# Patient Record
Sex: MALE | Race: WHITE | NOT HISPANIC OR LATINO | Employment: FULL TIME | URBAN - METROPOLITAN AREA
[De-identification: names, ages, dates, MRNs, and addresses within clinical notes are randomized per-mention and may not be internally consistent; named-entity substitution may affect disease eponyms.]

---

## 2017-12-04 ENCOUNTER — OFFICE VISIT (OUTPATIENT)
Dept: INTERNAL MEDICINE | Facility: CLINIC | Age: 47
End: 2017-12-04

## 2017-12-04 VITALS
WEIGHT: 201 LBS | RESPIRATION RATE: 12 BRPM | BODY MASS INDEX: 28.14 KG/M2 | OXYGEN SATURATION: 99 % | DIASTOLIC BLOOD PRESSURE: 80 MMHG | SYSTOLIC BLOOD PRESSURE: 130 MMHG | TEMPERATURE: 97.8 F | HEIGHT: 71 IN | HEART RATE: 87 BPM

## 2017-12-04 DIAGNOSIS — Z00.00 HEALTHCARE MAINTENANCE: Primary | ICD-10-CM

## 2017-12-04 DIAGNOSIS — G89.29 CHRONIC BILATERAL LOW BACK PAIN WITHOUT SCIATICA: ICD-10-CM

## 2017-12-04 DIAGNOSIS — M54.50 CHRONIC BILATERAL LOW BACK PAIN WITHOUT SCIATICA: ICD-10-CM

## 2017-12-04 DIAGNOSIS — M21.70 LEG LENGTH DISCREPANCY: ICD-10-CM

## 2017-12-04 PROBLEM — Z80.0 FAMILY HX OF COLON CANCER: Status: ACTIVE | Noted: 2017-07-24

## 2017-12-04 PROCEDURE — 90715 TDAP VACCINE 7 YRS/> IM: CPT | Performed by: INTERNAL MEDICINE

## 2017-12-04 PROCEDURE — 93000 ELECTROCARDIOGRAM COMPLETE: CPT | Performed by: INTERNAL MEDICINE

## 2017-12-04 PROCEDURE — 90472 IMMUNIZATION ADMIN EACH ADD: CPT | Performed by: INTERNAL MEDICINE

## 2017-12-04 PROCEDURE — 72110 X-RAY EXAM L-2 SPINE 4/>VWS: CPT | Performed by: INTERNAL MEDICINE

## 2017-12-04 PROCEDURE — 90686 IIV4 VACC NO PRSV 0.5 ML IM: CPT | Performed by: INTERNAL MEDICINE

## 2017-12-04 PROCEDURE — 99386 PREV VISIT NEW AGE 40-64: CPT | Performed by: INTERNAL MEDICINE

## 2017-12-04 PROCEDURE — 99212 OFFICE O/P EST SF 10 MIN: CPT | Performed by: INTERNAL MEDICINE

## 2017-12-04 PROCEDURE — 90471 IMMUNIZATION ADMIN: CPT | Performed by: INTERNAL MEDICINE

## 2017-12-04 NOTE — PROGRESS NOTES
"Annual Exam (new pt cpe )      HPI  Omar Ha is a 47 y.o. male presents to establish/ new pt CPE:  Transfer from Dr. Echols, but has not seen in last 5 years.   1. Low back pain - has had this for a long period of time, since high school.  Told in past had a \"curvature of spine\".  Has managed for a long time, but started working out more in Spring and had to stop due to severe pain developing in very low back bilaterally. Pt notes does have leg length discrepancy and feels like that is contributing.  Pain does not radiate down to leg.  No weakness in legs.  Has improved since quit exercising (was doing p90X format at the time).    2. Fhx of colon cancer - has strong Fhx and has had C-scopes since age 26. Q 5 year scope was done last month.  Pt had 2 polyps recently, not sure what pathology was. \"I just know everything was fine\".   3. Nephrolithiasis - occurred 10 years ago. Not sure what type.  Did pass stone on his own.  Not sure about dietary recs, \"there was no restrictions I was put on\".          Review of Systems   Constitution: Positive for weight loss (doiwn 10# overall on the year). Negative for chills, fever, malaise/fatigue and weight gain.   HENT: Negative for congestion, hearing loss, odynophagia and sore throat.    Eyes: Negative for discharge, double vision, pain and redness.        Last eye exam 2016; wears contacts     Cardiovascular: Negative for chest pain, dyspnea on exertion, irregular heartbeat, leg swelling, near-syncope, palpitations and syncope.   Respiratory: Negative for cough and shortness of breath.    Hematologic/Lymphatic: Negative for bleeding problem. Does not bruise/bleed easily.   Skin: Negative for rash, skin cancer and suspicious lesions.        Sees derm annually; no hx of CA in self     Musculoskeletal: Positive for back pain. Negative for joint pain, joint swelling, muscle cramps and muscle weakness.   Gastrointestinal: Negative for constipation, diarrhea, dysphagia, " heartburn, nausea and vomiting.   Genitourinary: Negative for dysuria, frequency, hematuria and hesitancy.   Neurological: Negative for dizziness, headaches and light-headedness.   Psychiatric/Behavioral: Negative for depression. The patient does not have insomnia and is not nervous/anxious.    Allergic/Immunologic: Positive for environmental allergies (has some seasonal issues, never been tested, takes occasional Claritin).       Problem List:    Patient Active Problem List   Diagnosis   • Family hx of colon cancer   • Chronic bilateral low back pain without sciatica       Medical History:    Past Medical History:   Diagnosis Date   • Nephrolithiasis 2007    unknown type of stone        Social History:    Social History     Social History   • Marital status:      Spouse name: N/A   • Number of children: 3   • Years of education: N/A     Occupational History   •       [employer]     Social History Main Topics   • Smoking status: Never Smoker   • Smokeless tobacco: Never Used   • Alcohol use Yes      Comment: 2 drinks/ month   • Drug use: No   • Sexual activity: Yes     Partners: Female      Comment: wife only; no hx STD's     Other Topics Concern   • Not on file     Social History Narrative    Diet - healthy overall, eats fruits and vegetables    Exercise - P90X in past, variable currently    Caffeine - tea, decaf       Family History:   Family History   Problem Relation Age of Onset   • Mitral valve prolapse Mother    • Uterine cancer Mother    • Colon cancer Mother    • Hypertension Mother    • Hypothyroidism Mother    • Melanoma Mother    • Squamous cell carcinoma Mother    • Leukemia Father      CLL   • Skin cancer Father    • Bell's palsy Father    • Hypertension Father    • Hyperlipidemia Father    • Skin cancer Sister    • Colon cancer Brother    • No Known Problems Daughter    • No Known Problems Son    • No Known Problems Daughter        Surgical History:   Past Surgical History:    Procedure Laterality Date   • PATELLA FRACTURE SURGERY  1986       No current outpatient prescriptions on file.    Vitals:    12/04/17 1306   BP: 130/80   Pulse: 87   Resp: 12   Temp: 97.8 °F (36.6 °C)   SpO2: 99%         Physical Exam   Constitutional: He is oriented to person, place, and time. He appears well-developed and well-nourished. He is cooperative. No distress.   HENT:   Head: Normocephalic and atraumatic.   Right Ear: Hearing, tympanic membrane, external ear and ear canal normal.   Left Ear: Hearing, tympanic membrane, external ear and ear canal normal.   Nose: Nose normal.   Mouth/Throat: Uvula is midline, oropharynx is clear and moist and mucous membranes are normal. No oropharyngeal exudate.   Eyes: Conjunctivae, EOM and lids are normal. Pupils are equal, round, and reactive to light. Right eye exhibits no discharge. Left eye exhibits no discharge. No scleral icterus.   Neck: Normal range of motion and full passive range of motion without pain. Neck supple. Carotid bruit is not present. No thyroid mass and no thyromegaly present.   Cardiovascular: Normal rate, regular rhythm, S1 normal, S2 normal, normal heart sounds and intact distal pulses.  Exam reveals no gallop and no friction rub.    No murmur heard.  Pulses:       Carotid pulses are 2+ on the right side, and 2+ on the left side.       Radial pulses are 2+ on the right side, and 2+ on the left side.        Dorsalis pedis pulses are 2+ on the right side, and 2+ on the left side.        Posterior tibial pulses are 2+ on the right side, and 2+ on the left side.   Pulmonary/Chest: Effort normal and breath sounds normal. No respiratory distress. He has no decreased breath sounds. He has no wheezes. He has no rhonchi. He has no rales.   Abdominal: Soft. Bowel sounds are normal. He exhibits no distension and no mass. There is no hepatosplenomegaly. There is no tenderness. There is no rebound and no guarding.   Genitourinary: Rectum normal and  prostate normal. Prostate is not enlarged and not tender.   Musculoskeletal: Normal range of motion. He exhibits no edema.       Vascular Status -  His exam exhibits right foot vasculature abnormal and no right foot edema. His exam exhibits left foot vasculature abnormal and no left foot edema.  Lymphadenopathy:     He has no cervical adenopathy.     He has no axillary adenopathy.        Right: No inguinal adenopathy present.        Left: No inguinal adenopathy present.   Neurological: He is alert and oriented to person, place, and time. He has normal strength and normal reflexes. He displays no tremor. No cranial nerve deficit or sensory deficit. He exhibits normal muscle tone. Gait normal.   Skin: Skin is warm, dry and intact. No rash noted.   Psychiatric: He has a normal mood and affect. His speech is normal and behavior is normal. Cognition and memory are normal.   Vitals reviewed.        ECG 12 Lead  Date/Time: 12/4/2017 2:22 PM  Performed by: DARIUS OBANDO  Authorized by: DARIUS OBANDO   Rhythm: sinus rhythm  Rate: normal  BPM: 72  ST Depression: I, II, III, aVF, V2, V3, V4, V5 and V6 (< 1mm)  T Waves: T waves normal  QRS axis: normal  Clinical impression: non-specific ECG  Comments: QTc - 393  Indication - Baseline, new pt CPE          XR lumbar: Bilateral low back pain; No prior for comparison  Normal alignment  No fracture  No scoliosis  Normal lordosis  No DDD noted    Assessment/ Plan  Diagnoses and all orders for this visit:    Healthcare maintenance  -     CBC & Differential  -     Comprehensive Metabolic Panel  -     Lipid Panel With LDL / HDL Ratio  -     TSH  -     UA / M With / Rflx Culture([facility] ONLY) - Urine, Clean Catch  -     Vitamin D 25 Hydroxy  -     Flu Vaccine Quad PF >18YR  -     Tdap Vaccine Greater Than or Equal To 8yo IM    Chronic bilateral low back pain without sciatica  -     UA / M With / Rflx Culture([facility] ONLY) - Urine, Clean Catch  -     XR Spine Lumbar 4+ View (In  Office)  -     Ambulatory Referral to Physical Therapy Evaluate and treat    Leg length discrepancy  -     Ambulatory Referral to Physical Therapy Evaluate and treat    Other orders  -     ECG 12 Lead        Return in about 1 year (around 12/4/2018) for Annual physical.      Discussion:  Omar Ha is a 47 y.o. male presents to establish/ new pt CPE:  Transfer from Dr. Echols.   1. Low back pain - XR spine is quite normal with normal alignment and no fracture. ? Leg length discrepency as etiology or injury incurred during P90X. I have asked pt to work with PT and see if we can improve sx working on core stregth and stretching. Reassured with normal lumbar spine X-ray today.  RTC is sx not better and can consider additional imaging.   2. Fhx of colon cancer - has strong Fhx with C-scopes since age 26, Q 5 years.  C-scope 11/2017 with 2 polyps --> 5 years.   3. Nephrolithiasis - occurred 10 years ago, passed. No recurrence.   4. Diffuse ST < 1mm depression on EKG - noted in past, per pt. Present on EKG today in multiple leads.  Reviewed normal stress test from 2012. Asx'ic. Monitor.   5. HM - check labs ; Flu/ Tdap - today;  C-scope UTD as above; BARON OK, d/w PSA at age 48; add back exercise.

## 2017-12-08 DIAGNOSIS — Z80.0 FAMILY HISTORY OF LYNCH SYNDROME: Primary | ICD-10-CM

## 2018-01-22 ENCOUNTER — TELEPHONE (OUTPATIENT)
Dept: INTERNAL MEDICINE | Facility: CLINIC | Age: 48
End: 2018-01-22

## 2018-01-22 LAB
25(OH)D3+25(OH)D2 SERPL-MCNC: 21 NG/ML (ref 30–100)
ALBUMIN SERPL-MCNC: 4.6 G/DL (ref 3.5–5.2)
ALBUMIN/GLOB SERPL: 1.7 G/DL
ALP SERPL-CCNC: 52 U/L (ref 39–117)
ALT SERPL-CCNC: 32 U/L (ref 1–41)
APPEARANCE UR: CLEAR
AST SERPL-CCNC: 18 U/L (ref 1–40)
BACTERIA #/AREA URNS HPF: NORMAL /HPF
BASOPHILS # BLD AUTO: 0.03 10*3/MM3 (ref 0–0.2)
BASOPHILS NFR BLD AUTO: 0.6 % (ref 0–1.5)
BILIRUB SERPL-MCNC: 0.7 MG/DL (ref 0.1–1.2)
BILIRUB UR QL STRIP: NEGATIVE
BUN SERPL-MCNC: 17 MG/DL (ref 6–20)
BUN/CREAT SERPL: 16 (ref 7–25)
CALCIUM SERPL-MCNC: 10.3 MG/DL (ref 8.6–10.5)
CHLORIDE SERPL-SCNC: 103 MMOL/L (ref 98–107)
CHOLEST SERPL-MCNC: 203 MG/DL (ref 0–200)
CO2 SERPL-SCNC: 30.7 MMOL/L (ref 22–29)
COLOR UR: YELLOW
CREAT SERPL-MCNC: 1.06 MG/DL (ref 0.76–1.27)
EOSINOPHIL # BLD AUTO: 0.21 10*3/MM3 (ref 0–0.7)
EOSINOPHIL NFR BLD AUTO: 4 % (ref 0.3–6.2)
EPI CELLS #/AREA URNS HPF: NORMAL /HPF
ERYTHROCYTE [DISTWIDTH] IN BLOOD BY AUTOMATED COUNT: 12.6 % (ref 11.5–14.5)
GLOBULIN SER CALC-MCNC: 2.7 GM/DL
GLUCOSE SERPL-MCNC: 96 MG/DL (ref 65–99)
GLUCOSE UR QL: NEGATIVE
HCT VFR BLD AUTO: 46 % (ref 40.4–52.2)
HDLC SERPL-MCNC: 50 MG/DL (ref 40–60)
HGB BLD-MCNC: 16.1 G/DL (ref 13.7–17.6)
HGB UR QL STRIP: NEGATIVE
IMM GRANULOCYTES # BLD: 0 10*3/MM3 (ref 0–0.03)
IMM GRANULOCYTES NFR BLD: 0 % (ref 0–0.5)
KETONES UR QL STRIP: NEGATIVE
LAB DIRECTOR NAME PROVIDER: NORMAL
LDLC SERPL CALC-MCNC: 118 MG/DL (ref 0–100)
LDLC/HDLC SERPL: 2.36 {RATIO}
LEUKOCYTE ESTERASE UR QL STRIP: NEGATIVE
LYMPHOCYTES # BLD AUTO: 1.23 10*3/MM3 (ref 0.9–4.8)
LYMPHOCYTES NFR BLD AUTO: 23.6 % (ref 19.6–45.3)
Lab: NORMAL
MCH RBC QN AUTO: 31 PG (ref 27–32.7)
MCHC RBC AUTO-ENTMCNC: 35 G/DL (ref 32.6–36.4)
MCV RBC AUTO: 88.5 FL (ref 79.8–96.2)
MICRO URNS: NORMAL
MICRO URNS: NORMAL
MLH1+MSH2+MSH6+PMS2 GN DEL+DUP+FUL M: NORMAL
MONOCYTES # BLD AUTO: 0.62 10*3/MM3 (ref 0.2–1.2)
MONOCYTES NFR BLD AUTO: 11.9 % (ref 5–12)
MUCOUS THREADS URNS QL MICRO: PRESENT /HPF
NEUTROPHILS # BLD AUTO: 3.13 10*3/MM3 (ref 1.9–8.1)
NEUTROPHILS NFR BLD AUTO: 59.9 % (ref 42.7–76)
NITRITE UR QL STRIP: NEGATIVE
PH UR STRIP: 5 [PH] (ref 5–7.5)
PLATELET # BLD AUTO: 193 10*3/MM3 (ref 140–500)
POTASSIUM SERPL-SCNC: 4.7 MMOL/L (ref 3.5–5.2)
PROT SERPL-MCNC: 7.3 G/DL (ref 6–8.5)
PROT UR QL STRIP: NEGATIVE
RBC # BLD AUTO: 5.2 10*6/MM3 (ref 4.6–6)
RBC #/AREA URNS HPF: NORMAL /HPF
SODIUM SERPL-SCNC: 146 MMOL/L (ref 136–145)
SP GR UR: 1.02 (ref 1–1.03)
SPECIMEN SOURCE: NORMAL
TRIGL SERPL-MCNC: 176 MG/DL (ref 0–150)
TSH SERPL DL<=0.005 MIU/L-ACNC: 2.38 MIU/ML (ref 0.27–4.2)
URINALYSIS REFLEX: NORMAL
UROBILINOGEN UR STRIP-MCNC: 0.2 MG/DL (ref 0.2–1)
VLDLC SERPL CALC-MCNC: 35.2 MG/DL (ref 5–40)
WBC # BLD AUTO: 5.22 10*3/MM3 (ref 4.5–10.7)
WBC #/AREA URNS HPF: NORMAL /HPF

## 2018-01-22 NOTE — TELEPHONE ENCOUNTER
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 The results are under the lab tab in the pt's chart.

## 2018-02-01 ENCOUNTER — TELEPHONE (OUTPATIENT)
Dept: INTERNAL MEDICINE | Facility: CLINIC | Age: 48
End: 2018-02-01

## 2018-02-01 NOTE — TELEPHONE ENCOUNTER
Call [facility] group. Do they have a genetic counselor at [facility]?? If not, will need to refer to [facility].

## 2018-02-01 NOTE — TELEPHONE ENCOUNTER
Pt called and stated that he is needing a referral for the [facility] for the genetic counselor. 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

## 2018-02-02 NOTE — TELEPHONE ENCOUNTER
Genetic counseling under [facility]. And Diann will take care of the rest. [phone]

## 2018-02-05 DIAGNOSIS — Z14.8 CARRIER OF GENE FOR LYNCH SYNDROME: ICD-10-CM

## 2018-02-05 DIAGNOSIS — Z80.0 FAMILY HISTORY OF LYNCH SYNDROME: Primary | ICD-10-CM

## 2018-04-24 ENCOUNTER — CLINICAL SUPPORT (OUTPATIENT)
Dept: OTHER | Facility: HOSPITAL | Age: 48
End: 2018-04-24

## 2018-04-24 DIAGNOSIS — Z15.09 GENETIC PREDISPOSITION TO CANCER: Primary | ICD-10-CM

## 2018-04-24 DIAGNOSIS — Z80.0 FAMILY HISTORY OF MALIGNANT NEOPLASM OF GASTROINTESTINAL TRACT: ICD-10-CM

## 2018-04-24 PROCEDURE — G0463 HOSPITAL OUTPT CLINIC VISIT: HCPCS

## 2018-11-15 ENCOUNTER — FLU SHOT (OUTPATIENT)
Dept: INTERNAL MEDICINE | Facility: CLINIC | Age: 48
End: 2018-11-15

## 2018-11-15 PROCEDURE — 90471 IMMUNIZATION ADMIN: CPT | Performed by: INTERNAL MEDICINE

## 2018-11-15 PROCEDURE — 90674 CCIIV4 VAC NO PRSV 0.5 ML IM: CPT | Performed by: INTERNAL MEDICINE

## 2018-12-03 DIAGNOSIS — Z00.00 HEALTHCARE MAINTENANCE: Primary | ICD-10-CM

## 2018-12-05 LAB
25(OH)D3+25(OH)D2 SERPL-MCNC: 26 NG/ML (ref 30–100)
ALBUMIN SERPL-MCNC: 5.1 G/DL (ref 3.5–5.2)
ALBUMIN/GLOB SERPL: 2.2 G/DL
ALP SERPL-CCNC: 47 U/L (ref 39–117)
ALT SERPL-CCNC: 30 U/L (ref 1–41)
APPEARANCE UR: CLEAR
AST SERPL-CCNC: 20 U/L (ref 1–40)
BACTERIA #/AREA URNS HPF: NORMAL /HPF
BASOPHILS # BLD AUTO: 0.02 10*3/MM3 (ref 0–0.2)
BASOPHILS NFR BLD AUTO: 0.4 % (ref 0–1.5)
BILIRUB SERPL-MCNC: 0.7 MG/DL (ref 0.1–1.2)
BILIRUB UR QL STRIP: NEGATIVE
BUN SERPL-MCNC: 19 MG/DL (ref 6–20)
BUN/CREAT SERPL: 17.4 (ref 7–25)
CALCIUM SERPL-MCNC: 10 MG/DL (ref 8.6–10.5)
CHLORIDE SERPL-SCNC: 104 MMOL/L (ref 98–107)
CHOLEST SERPL-MCNC: 222 MG/DL (ref 0–200)
CO2 SERPL-SCNC: 29.9 MMOL/L (ref 22–29)
COLOR UR: YELLOW
CREAT SERPL-MCNC: 1.09 MG/DL (ref 0.76–1.27)
EOSINOPHIL # BLD AUTO: 0.19 10*3/MM3 (ref 0–0.7)
EOSINOPHIL NFR BLD AUTO: 3.6 % (ref 0.3–6.2)
EPI CELLS #/AREA URNS HPF: NORMAL /HPF
ERYTHROCYTE [DISTWIDTH] IN BLOOD BY AUTOMATED COUNT: 12.5 % (ref 11.5–14.5)
GLOBULIN SER CALC-MCNC: 2.3 GM/DL
GLUCOSE SERPL-MCNC: 105 MG/DL (ref 65–99)
GLUCOSE UR QL: NEGATIVE
HCT VFR BLD AUTO: 46.8 % (ref 40.4–52.2)
HDLC SERPL-MCNC: 56 MG/DL (ref 40–60)
HGB BLD-MCNC: 16.6 G/DL (ref 13.7–17.6)
HGB UR QL STRIP: NEGATIVE
IMM GRANULOCYTES # BLD: 0.01 10*3/MM3 (ref 0–0.03)
IMM GRANULOCYTES NFR BLD: 0.2 % (ref 0–0.5)
KETONES UR QL STRIP: NEGATIVE
LDLC SERPL CALC-MCNC: 145 MG/DL (ref 0–100)
LEUKOCYTE ESTERASE UR QL STRIP: NEGATIVE
LYMPHOCYTES # BLD AUTO: 1.21 10*3/MM3 (ref 0.9–4.8)
LYMPHOCYTES NFR BLD AUTO: 23 % (ref 19.6–45.3)
MCH RBC QN AUTO: 30.6 PG (ref 27–32.7)
MCHC RBC AUTO-ENTMCNC: 35.5 G/DL (ref 32.6–36.4)
MCV RBC AUTO: 86.3 FL (ref 79.8–96.2)
MICRO URNS: NORMAL
MICRO URNS: NORMAL
MONOCYTES # BLD AUTO: 0.59 10*3/MM3 (ref 0.2–1.2)
MONOCYTES NFR BLD AUTO: 11.2 % (ref 5–12)
NEUTROPHILS # BLD AUTO: 3.25 10*3/MM3 (ref 1.9–8.1)
NEUTROPHILS NFR BLD AUTO: 61.8 % (ref 42.7–76)
NITRITE UR QL STRIP: NEGATIVE
PH UR STRIP: 6 [PH] (ref 5–7.5)
PLATELET # BLD AUTO: 186 10*3/MM3 (ref 140–500)
POTASSIUM SERPL-SCNC: 4.4 MMOL/L (ref 3.5–5.2)
PROT SERPL-MCNC: 7.4 G/DL (ref 6–8.5)
PROT UR QL STRIP: NEGATIVE
RBC # BLD AUTO: 5.42 10*6/MM3 (ref 4.6–6)
RBC #/AREA URNS HPF: NORMAL /HPF
SODIUM SERPL-SCNC: 146 MMOL/L (ref 136–145)
SP GR UR: 1.02 (ref 1–1.03)
TRIGL SERPL-MCNC: 107 MG/DL (ref 0–150)
TSH SERPL DL<=0.005 MIU/L-ACNC: 2.46 MIU/ML (ref 0.27–4.2)
URINALYSIS REFLEX: NORMAL
UROBILINOGEN UR STRIP-MCNC: 0.2 MG/DL (ref 0.2–1)
VLDLC SERPL CALC-MCNC: 21.4 MG/DL (ref 5–40)
WBC # BLD AUTO: 5.26 10*3/MM3 (ref 4.5–10.7)
WBC #/AREA URNS HPF: NORMAL /HPF

## 2018-12-11 ENCOUNTER — OFFICE VISIT (OUTPATIENT)
Dept: INTERNAL MEDICINE | Facility: CLINIC | Age: 48
End: 2018-12-11

## 2018-12-11 VITALS
HEART RATE: 63 BPM | OXYGEN SATURATION: 97 % | SYSTOLIC BLOOD PRESSURE: 130 MMHG | DIASTOLIC BLOOD PRESSURE: 88 MMHG | HEIGHT: 70 IN | TEMPERATURE: 98 F | BODY MASS INDEX: 27.92 KG/M2 | WEIGHT: 195 LBS

## 2018-12-11 DIAGNOSIS — Z80.0 FAMILY HX OF COLON CANCER: ICD-10-CM

## 2018-12-11 DIAGNOSIS — Z00.00 HEALTHCARE MAINTENANCE: Primary | ICD-10-CM

## 2018-12-11 DIAGNOSIS — E78.5 DYSLIPIDEMIA: ICD-10-CM

## 2018-12-11 DIAGNOSIS — Z15.09 LYNCH SYNDROME: ICD-10-CM

## 2018-12-11 DIAGNOSIS — E55.9 VITAMIN D DEFICIENCY: ICD-10-CM

## 2018-12-11 PROBLEM — G89.29 CHRONIC BILATERAL LOW BACK PAIN WITHOUT SCIATICA: Status: RESOLVED | Noted: 2017-12-04 | Resolved: 2018-12-11

## 2018-12-11 PROBLEM — M54.50 CHRONIC BILATERAL LOW BACK PAIN WITHOUT SCIATICA: Status: RESOLVED | Noted: 2017-12-04 | Resolved: 2018-12-11

## 2018-12-11 LAB — PSA SERPL-MCNC: 2.02 NG/ML (ref 0–4)

## 2018-12-11 PROCEDURE — 99396 PREV VISIT EST AGE 40-64: CPT | Performed by: INTERNAL MEDICINE

## 2018-12-11 PROCEDURE — 90471 IMMUNIZATION ADMIN: CPT | Performed by: INTERNAL MEDICINE

## 2018-12-11 PROCEDURE — 90632 HEPA VACCINE ADULT IM: CPT | Performed by: INTERNAL MEDICINE

## 2018-12-11 RX ORDER — MELATONIN
1000 DAILY
COMMUNITY
End: 2020-12-18

## 2018-12-11 NOTE — PROGRESS NOTES
"Annual Exam (CPE )      HPI  Omar Ha is a 48 y.o. male RTC In yearly CPE, review of medical issues:  1. Low back pain -  \"it is really kind of gone\".  Feels like that workout routine had done this to him. Stopped working out and pain went away. Has not restarted yet.    Plans to start routine based on planks that has helped friend with back pain.   2. 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  No blood anywhere. Has no sx.  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  Recs were for C-scope were once yearly.  Has been seeing Dr. Box for last 15 years and has not discussed with him yet.  Plans to change to Dr. Yen where has a Lindquist specialist there.    3. Nephrolithiasis - occurred 10 years ago, passed. No recurrence.     Review of Systems   Constitution: Negative for chills, fever, malaise/fatigue, weight gain and weight loss.   HENT: Negative for congestion, hearing loss, sore throat and tinnitus.    Eyes: Negative for discharge, double vision, pain and redness.        Last eye exam ~12/2017; wears contacts     Cardiovascular: Negative for chest pain, dyspnea on exertion, irregular heartbeat, leg swelling, near-syncope, palpitations and syncope.   Respiratory: Negative for cough and shortness of breath.    Endocrine: Negative for polydipsia, polyphagia and polyuria.   Hematologic/Lymphatic: Negative for bleeding problem. Does not bruise/bleed easily.   Skin: Negative for rash and suspicious lesions.        Sees derm yearly.      Musculoskeletal: Negative for back pain, joint pain, joint swelling, muscle cramps, muscle weakness and myalgias.   Gastrointestinal: Negative for constipation, diarrhea, hematochezia, melena, nausea and vomiting.   Genitourinary: Negative for dysuria, frequency, hematuria and hesitancy.   Neurological: Positive for dizziness (very mild sx a few weeks ago, for one week, resolved. Never " "eval'd. ). Negative for headaches, light-headedness and vertigo.   Psychiatric/Behavioral: Negative for depression. The patient does not have insomnia and is not nervous/anxious.    Allergic/Immunologic: Negative for environmental allergies and persistent infections.       Problem List:    Patient Active Problem List   Diagnosis   • Family hx of colon cancer   • Lindquist syndrome   • Vitamin D deficiency       Medical History:    Past Medical History:   Diagnosis Date   • Chronic bilateral low back pain without sciatica 12/4/2017   • Nephrolithiasis 2007    unknown type of stone   • Vitamin D deficiency 12/11/2018        Social History:    Social History     Socioeconomic History   • Marital status:      Spouse name: Not on file   • Number of children: 3   • Years of education: Not on file   • Highest education level: Not on file   Social Needs   • Financial resource strain: Not on file   • Food insecurity - worry: Not on file   • Food insecurity - inability: Not on file   • Transportation needs - medical: Not on file   • Transportation needs - non-medical: Not on file   Occupational History   • Occupation:      Comment: [employer]   Tobacco Use   • Smoking status: Never Smoker   • Smokeless tobacco: Never Used   Substance and Sexual Activity   • Alcohol use: Yes     Comment: 2 drinks/ month   • Drug use: No   • Sexual activity: Yes     Partners: Female     Comment: wife only; no hx STD's   Other Topics Concern   • Not on file   Social History Narrative    Diet - healthy overall, eats fruits and vegetables; 2018 \"loose\" Paleo diet    Exercise - P90X in past, variable currently    Caffeine - tea, decaf       Family History:   Family History   Problem Relation Age of Onset   • Mitral valve prolapse Mother    • Uterine cancer Mother    • Colon cancer Mother    • Hypertension Mother    • Hypothyroidism Mother    • Melanoma Mother    • Squamous cell carcinoma Mother    • Leukemia Father         CLL   • Skin " cancer Father    • Bell's palsy Father    • Hypertension Father    • Hyperlipidemia Father    • Skin cancer Sister    • Colon cancer Brother    • No Known Problems Daughter    • No Known Problems Son    • No Known Problems Daughter        Surgical History:   Past Surgical History:   Procedure Laterality Date   • PATELLA FRACTURE SURGERY  1986         Current Outpatient Medications:   •  cholecalciferol (VITAMIN D3) 1000 units tablet, Take 1,000 Units by mouth Daily., Disp: , Rfl:     Vitals:    12/11/18 0909   BP: 130/88   Pulse: 63   Temp: 98 °F (36.7 °C)   SpO2: 97%       Physical Exam   Constitutional: He is oriented to person, place, and time. He appears well-developed and well-nourished. He is cooperative. No distress.   HENT:   Head: Normocephalic and atraumatic.   Right Ear: Hearing, tympanic membrane, external ear and ear canal normal.   Left Ear: Hearing, tympanic membrane, external ear and ear canal normal.   Nose: Nose normal.   Mouth/Throat: Uvula is midline, oropharynx is clear and moist and mucous membranes are normal. No oropharyngeal exudate.   Eyes: Conjunctivae, EOM and lids are normal. Pupils are equal, round, and reactive to light. Right eye exhibits no discharge. Left eye exhibits no discharge. No scleral icterus.   Neck: Normal range of motion and full passive range of motion without pain. Neck supple. Carotid bruit is not present. No thyroid mass and no thyromegaly present.   Cardiovascular: Normal rate, regular rhythm, S1 normal, S2 normal and normal heart sounds. Exam reveals no gallop and no friction rub.   No murmur heard.  Pulses:       Radial pulses are 2+ on the right side, and 2+ on the left side.        Dorsalis pedis pulses are 2+ on the right side, and 2+ on the left side.        Posterior tibial pulses are 2+ on the right side, and 2+ on the left side.   Pulmonary/Chest: Effort normal and breath sounds normal. No respiratory distress. He has no wheezes. He has no rhonchi. He has no  rales.   Abdominal: Soft. Bowel sounds are normal. He exhibits no distension and no mass. There is no hepatosplenomegaly. There is no tenderness. There is no rebound and no guarding.   Genitourinary: Rectum normal and prostate normal. Prostate is not enlarged and not tender.   Musculoskeletal: Normal range of motion. He exhibits no edema.     Vascular Status -  His right foot exhibits normal foot vasculature  and no edema. His left foot exhibits normal foot vasculature  and no edema.  Skin Integrity  -  His right foot skin is intact.His left foot skin is intact..  Lymphadenopathy:     He has no cervical adenopathy.     He has no axillary adenopathy.        Right: No inguinal adenopathy present.        Left: No inguinal adenopathy present.   Neurological: He is alert and oriented to person, place, and time. He has normal strength and normal reflexes. He displays no tremor. No cranial nerve deficit or sensory deficit. He exhibits normal muscle tone. Gait normal.   Skin: Skin is warm, dry and intact. No rash noted.        Psychiatric: He has a normal mood and affect. His speech is normal and behavior is normal. Thought content normal. Cognition and memory are normal.   Vitals reviewed.      Assessment/ Plan  Diagnoses and all orders for this visit:    Healthcare maintenance  -     Cancel: Hepatitis A Vaccine Adult IM  -     Hepatitis A Vaccine Adult IM  -     PSA Screen    Family hx of colon cancer    Lindquist syndrome  -     PSA Screen    Vitamin D deficiency    Dyslipidemia    Other orders  -     cholecalciferol (VITAMIN D3) 1000 units tablet; Take 1,000 Units by mouth Daily.        Return in about 1 year (around 12/11/2019) for Annual physical.      Discussion:  Omar Ha is a 48 y.o. male RTC In yearly CPE, review of medical issues:  1. Low back pain - resolved after stopped P90X. Will monitor as pt re-engages in exercise routine.   2. FHx of colon cancer, new dx of Lindquist Syndrome - has strong Fhx with C-scopes  since age 26, Q 5 years. Has now seen Dr. 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  Will proceed with annual U/A, neuro exam with low threshold for MRI brain for any sx, PSA for prostate CA screen.  Anticipate EGD with next C-scope (suggest q 2 years, but will eval with new GI consult at Dr. Yen's office), last C-scope 11/2017 with 2 polyps.  C/W annual skin exam with derm.    3. Hx of Nephrolithiasis - occurred 10 years ago, passed. No recurrence. U/A clear today.   4. Vitamin D deficiency - persists on labs. Start 1000 I.U. Vitamin D3 OTC daily.  5. Dyslipidemia - new issues today with progresion of LDL off exercise regimen. 10 yr CR 3.7% and not statin candidate, but advise pt to c/w TLC diet and add back exercise at least 3x/ week.   6. HM - labs d/w pt; Flu/ Tdap - UTD;  Hep A #1 today; Shingrix at pharmacy; C-scope as above; BARON OK, check PSA yearly; add back exercise.     RTC one year CPE, F labs prior

## 2019-06-13 ENCOUNTER — LAB (OUTPATIENT)
Dept: INTERNAL MEDICINE | Facility: CLINIC | Age: 49
End: 2019-06-13

## 2019-06-13 PROCEDURE — 90471 IMMUNIZATION ADMIN: CPT | Performed by: INTERNAL MEDICINE

## 2019-06-13 PROCEDURE — 90632 HEPA VACCINE ADULT IM: CPT | Performed by: INTERNAL MEDICINE

## 2019-11-04 ENCOUNTER — TELEPHONE (OUTPATIENT)
Dept: INTERNAL MEDICINE | Facility: CLINIC | Age: 49
End: 2019-11-04

## 2019-11-04 NOTE — TELEPHONE ENCOUNTER
Pt is wanting to know if you can send in a muscle relaxer. He woke up this morning with what he thinks is a pinch nerve. He wanted to give it a couple of days to see if it would get better before he made an appointment.

## 2019-11-05 ENCOUNTER — OFFICE VISIT (OUTPATIENT)
Dept: INTERNAL MEDICINE | Facility: CLINIC | Age: 49
End: 2019-11-05

## 2019-11-05 VITALS
OXYGEN SATURATION: 97 % | HEIGHT: 70 IN | WEIGHT: 208 LBS | SYSTOLIC BLOOD PRESSURE: 130 MMHG | BODY MASS INDEX: 29.78 KG/M2 | TEMPERATURE: 98.1 F | HEART RATE: 83 BPM | DIASTOLIC BLOOD PRESSURE: 90 MMHG

## 2019-11-05 DIAGNOSIS — S16.1XXA CERVICAL MUSCLE STRAIN, INITIAL ENCOUNTER: Primary | ICD-10-CM

## 2019-11-05 DIAGNOSIS — M54.2 ACUTE NECK PAIN: ICD-10-CM

## 2019-11-05 PROCEDURE — 72050 X-RAY EXAM NECK SPINE 4/5VWS: CPT | Performed by: INTERNAL MEDICINE

## 2019-11-05 PROCEDURE — 99214 OFFICE O/P EST MOD 30 MIN: CPT | Performed by: INTERNAL MEDICINE

## 2019-11-05 RX ORDER — CYCLOBENZAPRINE HCL 5 MG
5 TABLET ORAL NIGHTLY PRN
Qty: 20 TABLET | Refills: 1 | Status: SHIPPED | OUTPATIENT
Start: 2019-11-05 | End: 2019-12-16

## 2019-11-05 RX ORDER — ACETAMINOPHEN 500 MG
TABLET ORAL
Qty: 30 TABLET | Refills: 0
Start: 2019-11-05 | End: 2019-12-16

## 2019-11-05 NOTE — PROGRESS NOTES
"Neck Pain (stiffness x1 day )      HPI  Omar Ha is a 49 y.o. male RTC in acute care:   WOke up yesterday AM with \"what I assume is a pinched nerve in my neck\".  The night prior was fine.  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  Pain is just to L side, occurs with certain movements.  No radiation. No weakness in arm. NO tingling in arm.  No issues swallowing.  No pain on R side. No pain at rest, but only has pain with certain movements.  No sure exactly what will cause the pain.    No URI sx. No fevers.  No HA.     Recalls vertigo issue in May, resolved.  Similar mild event in 9/2019, but was short lived.  Little dizzy last week, but nothing like this in past.     Meds: Ibuprofen, last dose 4 hours ago.      THinks pain is a bit better today or \"I am getting more wise about my movements\".       Review of Systems   Constitution: Negative for chills and fever.   HENT: Negative for congestion, ear discharge, ear pain, hoarse voice and sore throat.    Cardiovascular: Negative for dyspnea on exertion.   Respiratory: Negative for cough and shortness of breath.    Musculoskeletal: Positive for neck pain. Negative for muscle weakness.   Neurological: Negative for focal weakness, numbness and paresthesias.       The following portions of the patient's history were reviewed and updated as appropriate: allergies, current medications, past medical history, past social history and problem list.      Current Outpatient Medications:   •  cholecalciferol (VITAMIN D3) 1000 units tablet, Take 1,000 Units by mouth Daily., Disp: , Rfl:   •  acetaminophen (TYLENOL) 500 MG tablet, 2 tabs PO Q 8 hours PRN, Disp: 30 tablet, Rfl: 0  •  cyclobenzaprine (FLEXERIL) 5 MG tablet, Take 1 tablet by mouth At Night As Needed for Muscle Spasms., Disp: 20 tablet, Rfl: 1  •  diclofenac (VOLTAREN) 1 % gel gel, Apply 4 g topically to the appropriate area as directed 4 (Four) Times a Day., Disp: 100 g, Rfl: " "1    Vitals:    11/05/19 1453   BP: 130/90   Pulse: 83   Temp: 98.1 °F (36.7 °C)   SpO2: 97%   Weight: 94.3 kg (208 lb)   Height: 177.8 cm (70\")         Physical Exam   Constitutional: He is oriented to person, place, and time. He appears well-developed and well-nourished. He does not have a sickly appearance. He does not appear ill. No distress.   HENT:   Head: Normocephalic and atraumatic.   Right Ear: Tympanic membrane, external ear and ear canal normal.   Left Ear: Tympanic membrane, external ear and ear canal normal.   Nose: No mucosal edema or rhinorrhea. Right sinus exhibits no maxillary sinus tenderness and no frontal sinus tenderness. Left sinus exhibits no maxillary sinus tenderness and no frontal sinus tenderness.   Mouth/Throat: Uvula is midline. Mucous membranes are not pale, not dry and not cyanotic. No oral lesions. No uvula swelling. No oropharyngeal exudate, posterior oropharyngeal edema or posterior oropharyngeal erythema.   Eyes: Conjunctivae are normal. Pupils are equal, round, and reactive to light. Right eye exhibits no discharge. Left eye exhibits no discharge.   Neck: Neck supple. No spinous process tenderness and no muscular tenderness present. No neck rigidity. Decreased range of motion (limited by perceived pain onset, movements slow and cautious. ) present.   Holds neck stiff at rest.    Cardiovascular: Normal rate and regular rhythm.   Pulmonary/Chest: Effort normal and breath sounds normal. No respiratory distress. He has no wheezes. He has no rales.   Lymphadenopathy:     He has no cervical adenopathy.   Neurological: He is alert and oriented to person, place, and time. He has normal strength. No cranial nerve deficit.   Reflex Scores:       Tricep reflexes are 2+ on the right side and 2+ on the left side.       Brachioradialis reflexes are 2+ on the right side and 2+ on the left side.  5/5 BUE strength   Psychiatric: He has a normal mood and affect. His behavior is normal.   Vitals " reviewed.    XR cervical: L sided neck pain; No prior for comparison  Normal alignment  Mild DDD C4-C7  No fracture  Intact facet joints     Assessment/ Plan  Diagnoses and all orders for this visit:    Cervical muscle strain, initial encounter  -     XR Spine Cervical Complete 4 or 5 View  -     diclofenac (VOLTAREN) 1 % gel gel; Apply 4 g topically to the appropriate area as directed 4 (Four) Times a Day.  -     acetaminophen (TYLENOL) 500 MG tablet; 2 tabs PO Q 8 hours PRN  -     cyclobenzaprine (FLEXERIL) 5 MG tablet; Take 1 tablet by mouth At Night As Needed for Muscle Spasms.    Acute neck pain  -     XR Spine Cervical Complete 4 or 5 View  -     diclofenac (VOLTAREN) 1 % gel gel; Apply 4 g topically to the appropriate area as directed 4 (Four) Times a Day.  -     acetaminophen (TYLENOL) 500 MG tablet; 2 tabs PO Q 8 hours PRN  -     cyclobenzaprine (FLEXERIL) 5 MG tablet; Take 1 tablet by mouth At Night As Needed for Muscle Spasms.        Return for Next scheduled follow up.      Discussion:  Omar Ha is a 49 y.o. male RTC in acute care (new issue to examiner) with one day of acute onset positional neck pain after a weekend of looking up for overhead electrical work.  Exam today is neuromuscularly intact, though pt is very cautious with his movements due to fear of intense positional pain event.   I highly suspect muscular strain. Will have pt use tx as noted above along with gentle neck stretch routine.  Reassured pt. Call if fails to improve.

## 2019-12-09 DIAGNOSIS — Z15.09 LYNCH SYNDROME: ICD-10-CM

## 2019-12-09 DIAGNOSIS — Z00.00 HEALTHCARE MAINTENANCE: Primary | ICD-10-CM

## 2019-12-09 DIAGNOSIS — Z12.5 SCREENING FOR PROSTATE CANCER: ICD-10-CM

## 2019-12-09 DIAGNOSIS — E55.9 VITAMIN D DEFICIENCY: ICD-10-CM

## 2019-12-12 LAB
25(OH)D3+25(OH)D2 SERPL-MCNC: 24.4 NG/ML (ref 30–100)
ALBUMIN SERPL-MCNC: 4.7 G/DL (ref 3.5–5.2)
ALBUMIN/GLOB SERPL: 2 G/DL
ALP SERPL-CCNC: 45 U/L (ref 39–117)
ALT SERPL-CCNC: 48 U/L (ref 1–41)
APPEARANCE UR: CLEAR
AST SERPL-CCNC: 27 U/L (ref 1–40)
BACTERIA #/AREA URNS HPF: ABNORMAL /HPF
BASOPHILS # BLD AUTO: 0.02 10*3/MM3 (ref 0–0.2)
BASOPHILS NFR BLD AUTO: 0.4 % (ref 0–1.5)
BILIRUB SERPL-MCNC: 0.7 MG/DL (ref 0.2–1.2)
BILIRUB UR QL STRIP: NEGATIVE
BUN SERPL-MCNC: 13 MG/DL (ref 6–20)
BUN/CREAT SERPL: 12.7 (ref 7–25)
CALCIUM SERPL-MCNC: 9.6 MG/DL (ref 8.6–10.5)
CASTS URNS MICRO: ABNORMAL
CASTS URNS QL MICRO: PRESENT /LPF
CHLORIDE SERPL-SCNC: 104 MMOL/L (ref 98–107)
CHOLEST SERPL-MCNC: 214 MG/DL (ref 0–200)
CO2 SERPL-SCNC: 31.5 MMOL/L (ref 22–29)
COLOR UR: YELLOW
CREAT SERPL-MCNC: 1.02 MG/DL (ref 0.76–1.27)
EOSINOPHIL # BLD AUTO: 0.18 10*3/MM3 (ref 0–0.4)
EOSINOPHIL NFR BLD AUTO: 3.4 % (ref 0.3–6.2)
EPI CELLS #/AREA URNS HPF: ABNORMAL /HPF (ref 0–10)
ERYTHROCYTE [DISTWIDTH] IN BLOOD BY AUTOMATED COUNT: 13.1 % (ref 12.3–15.4)
GLOBULIN SER CALC-MCNC: 2.4 GM/DL
GLUCOSE SERPL-MCNC: 100 MG/DL (ref 65–99)
GLUCOSE UR QL: NEGATIVE
HCT VFR BLD AUTO: 46.4 % (ref 37.5–51)
HDLC SERPL-MCNC: 51 MG/DL (ref 40–60)
HGB BLD-MCNC: 16.2 G/DL (ref 13–17.7)
HGB UR QL STRIP: NEGATIVE
IMM GRANULOCYTES # BLD AUTO: 0.01 10*3/MM3 (ref 0–0.05)
IMM GRANULOCYTES NFR BLD AUTO: 0.2 % (ref 0–0.5)
KETONES UR QL STRIP: NEGATIVE
LDLC SERPL CALC-MCNC: 136 MG/DL (ref 0–100)
LEUKOCYTE ESTERASE UR QL STRIP: NEGATIVE
LYMPHOCYTES # BLD AUTO: 1.35 10*3/MM3 (ref 0.7–3.1)
LYMPHOCYTES NFR BLD AUTO: 25.2 % (ref 19.6–45.3)
MCH RBC QN AUTO: 30.5 PG (ref 26.6–33)
MCHC RBC AUTO-ENTMCNC: 34.9 G/DL (ref 31.5–35.7)
MCV RBC AUTO: 87.2 FL (ref 79–97)
MICRO URNS: NORMAL
MICRO URNS: NORMAL
MONOCYTES # BLD AUTO: 0.52 10*3/MM3 (ref 0.1–0.9)
MONOCYTES NFR BLD AUTO: 9.7 % (ref 5–12)
MUCOUS THREADS URNS QL MICRO: PRESENT /HPF
NEUTROPHILS # BLD AUTO: 3.28 10*3/MM3 (ref 1.7–7)
NEUTROPHILS NFR BLD AUTO: 61.1 % (ref 42.7–76)
NITRITE UR QL STRIP: NEGATIVE
NRBC BLD AUTO-RTO: 0 /100 WBC (ref 0–0.2)
PH UR STRIP: 5.5 [PH] (ref 5–7.5)
PLATELET # BLD AUTO: 226 10*3/MM3 (ref 140–450)
POTASSIUM SERPL-SCNC: 4.7 MMOL/L (ref 3.5–5.2)
PROT SERPL-MCNC: 7.1 G/DL (ref 6–8.5)
PROT UR QL STRIP: NEGATIVE
PSA SERPL-MCNC: 2.14 NG/ML (ref 0–4)
RBC # BLD AUTO: 5.32 10*6/MM3 (ref 4.14–5.8)
RBC #/AREA URNS HPF: ABNORMAL /HPF (ref 0–2)
SODIUM SERPL-SCNC: 145 MMOL/L (ref 136–145)
SP GR UR: 1.01 (ref 1–1.03)
TRIGL SERPL-MCNC: 134 MG/DL (ref 0–150)
TSH SERPL DL<=0.005 MIU/L-ACNC: 2.3 UIU/ML (ref 0.27–4.2)
URINALYSIS REFLEX: NORMAL
UROBILINOGEN UR STRIP-MCNC: 0.2 MG/DL (ref 0.2–1)
VLDLC SERPL CALC-MCNC: 26.8 MG/DL
WBC # BLD AUTO: 5.36 10*3/MM3 (ref 3.4–10.8)
WBC #/AREA URNS HPF: ABNORMAL /HPF (ref 0–5)

## 2019-12-16 ENCOUNTER — OFFICE VISIT (OUTPATIENT)
Dept: INTERNAL MEDICINE | Facility: CLINIC | Age: 49
End: 2019-12-16

## 2019-12-16 VITALS
RESPIRATION RATE: 12 BRPM | SYSTOLIC BLOOD PRESSURE: 132 MMHG | WEIGHT: 206 LBS | HEART RATE: 79 BPM | TEMPERATURE: 98.8 F | HEIGHT: 70 IN | DIASTOLIC BLOOD PRESSURE: 82 MMHG | BODY MASS INDEX: 29.49 KG/M2 | OXYGEN SATURATION: 98 %

## 2019-12-16 DIAGNOSIS — Z15.09 LYNCH SYNDROME: ICD-10-CM

## 2019-12-16 DIAGNOSIS — E55.9 VITAMIN D DEFICIENCY: ICD-10-CM

## 2019-12-16 DIAGNOSIS — Z00.00 HEALTHCARE MAINTENANCE: Primary | ICD-10-CM

## 2019-12-16 DIAGNOSIS — R74.01 ELEVATED ALT MEASUREMENT: ICD-10-CM

## 2019-12-16 DIAGNOSIS — Z80.0 FAMILY HX OF COLON CANCER: ICD-10-CM

## 2019-12-16 DIAGNOSIS — Z91.09 ENVIRONMENTAL ALLERGIES: ICD-10-CM

## 2019-12-16 PROBLEM — K64.4 EXTERNAL HEMORRHOIDS: Status: ACTIVE | Noted: 2019-12-16

## 2019-12-16 PROCEDURE — 90674 CCIIV4 VAC NO PRSV 0.5 ML IM: CPT | Performed by: INTERNAL MEDICINE

## 2019-12-16 PROCEDURE — 99396 PREV VISIT EST AGE 40-64: CPT | Performed by: INTERNAL MEDICINE

## 2019-12-16 PROCEDURE — 90471 IMMUNIZATION ADMIN: CPT | Performed by: INTERNAL MEDICINE

## 2019-12-16 NOTE — PROGRESS NOTES
"Annual Exam (review of medical issues )      HPI   Omar Ha is a 49 y.o. male RTC in yearly CPE, review of medical issues:   Had some cervical pain after some overhead electrical work.  Resolved at 3 weeks. No stretches now. Has some feeling at spot but no pain now.  No pain radiating down arms.   1. Low back pain - resolved after stopped P90X. Will monitor as pt re-engages in exercise routine.   2. 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 Has now seen Dr. 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  Will get C-scope with EGD next month.  Will change to Dr. Yen as they 'specialize in Lindquist syndrome'.  Saw derm this past month.   3. Vitamin D deficiency - 1000 I.U. Vitamin D3 OTC most days, is not daily.       Review of Systems   Constitution: Positive for weight gain (up 10-12# since last year. ). Negative for chills, fever and malaise/fatigue.   HENT: Negative for congestion, hearing loss, odynophagia and sore throat.    Eyes: Negative for discharge, double vision, pain and redness.        Last eye exam 11/2019; wears contacts     Cardiovascular: Negative for chest pain, dyspnea on exertion, irregular heartbeat, leg swelling, near-syncope, palpitations and syncope.   Respiratory: Negative for cough and shortness of breath.    Endocrine: Negative for polydipsia, polyphagia and polyuria.   Hematologic/Lymphatic: Negative for bleeding problem. Does not bruise/bleed easily.   Skin: Negative for rash and suspicious lesions.   Musculoskeletal: Negative for joint pain, joint swelling, muscle cramps, muscle weakness, myalgias and neck pain (very mild awareness in neck).   Gastrointestinal: Positive for melena (a few weeks ago with hemorrhoid, \"a lot of blood\". Resolved at this time. ). Negative for constipation, diarrhea, dysphagia, heartburn (intermittent; ETOH is trigger; Tums up to 2x/ month. ), nausea and vomiting.   Genitourinary: Negative for " "dysuria, frequency, hematuria and hesitancy.   Neurological: Negative for dizziness, headaches and light-headedness.   Psychiatric/Behavioral: Negative for depression. The patient does not have insomnia and is not nervous/anxious.    Allergic/Immunologic: Positive for environmental allergies (nagging issues long term. uses Claritin once montlhly). Negative for persistent infections.       Problem List:    Patient Active Problem List   Diagnosis   • Family hx of colon cancer   • Lindquist syndrome   • Vitamin D deficiency   • Environmental allergies   • External hemorrhoids       Medical History:    Past Medical History:   Diagnosis Date   • Chronic bilateral low back pain without sciatica 12/4/2017   • Environmental allergies 12/16/2019   • External hemorrhoids 12/16/2019    Hx bleeding in 2019   • Nephrolithiasis 2007    unknown type of stone   • Vitamin D deficiency 12/11/2018        Social History:    Social History     Socioeconomic History   • Marital status:      Spouse name: Not on file   • Number of children: 3   • Years of education: Not on file   • Highest education level: Not on file   Occupational History   • Occupation:      Comment: [employer]   Tobacco Use   • Smoking status: Never Smoker   • Smokeless tobacco: Never Used   Substance and Sexual Activity   • Alcohol use: Yes     Comment: 2 drinks/ month   • Drug use: No   • Sexual activity: Yes     Partners: Female     Comment: wife only; no hx STD's   Lifestyle   • Physical activity:     Days per week: 3 days     Minutes per session: 50 min   • Stress: Not on file   Social History Narrative    Diet - healthy overall, eats fruits and vegetables; 2018 \"loose\" Paleo diet 3-4 months    Exercise - P90X in past; variable     Caffeine - tea, decaf       Family History:   Family History   Problem Relation Age of Onset   • Mitral valve prolapse Mother    • Uterine cancer Mother    • Colon cancer Mother    • Hypertension Mother    • Hypothyroidism " Mother    • Melanoma Mother    • Squamous cell carcinoma Mother    • Leukemia Father         CLL   • Skin cancer Father    • Bell's palsy Father    • Hypertension Father    • Hyperlipidemia Father    • Heart failure Father    • Heart disease Father         CABG x 4   • Skin cancer Sister    • Colon cancer Brother    • No Known Problems Daughter    • No Known Problems Son    • No Known Problems Daughter        Surgical History:   Past Surgical History:   Procedure Laterality Date   • PATELLA FRACTURE SURGERY Right 1986         Current Outpatient Medications:   •  cholecalciferol (VITAMIN D3) 1000 units tablet, Take 1,000 Units by mouth Daily., Disp: , Rfl:     Vitals:    12/16/19 1458   BP: 132/82   Pulse: 79   Resp: 12   Temp: 98.8 °F (37.1 °C)   SpO2: 98%     Body mass index is 29.56 kg/m².    Physical Exam   Constitutional: He is oriented to person, place, and time. He appears well-developed and well-nourished. He is cooperative. No distress.   HENT:   Head: Normocephalic and atraumatic.   Right Ear: Hearing, tympanic membrane, external ear and ear canal normal.   Left Ear: Hearing, tympanic membrane, external ear and ear canal normal.   Nose: Nose normal.   Mouth/Throat: Uvula is midline, oropharynx is clear and moist and mucous membranes are normal. No oropharyngeal exudate.   Eyes: Pupils are equal, round, and reactive to light. Conjunctivae, EOM and lids are normal. Right eye exhibits no discharge. Left eye exhibits no discharge. No scleral icterus.   Neck: Normal range of motion and full passive range of motion without pain. Neck supple. Carotid bruit is not present. No thyroid mass and no thyromegaly present.   Cardiovascular: Normal rate, regular rhythm, S1 normal, S2 normal and normal heart sounds. Exam reveals no gallop and no friction rub.   No murmur heard.  Pulses:       Radial pulses are 2+ on the right side, and 2+ on the left side.        Dorsalis pedis pulses are 2+ on the right side, and 2+ on the  left side.        Posterior tibial pulses are 2+ on the right side, and 2+ on the left side.   Pulmonary/Chest: Effort normal and breath sounds normal. No respiratory distress. He has no wheezes. He has no rhonchi. He has no rales.   Abdominal: Soft. Bowel sounds are normal. He exhibits no distension and no mass. There is no hepatosplenomegaly. There is no tenderness. There is no rebound and no guarding.   Genitourinary: Prostate normal. Rectal exam shows external hemorrhoid (non-thrombosed). Rectal exam shows anal tone normal. Prostate is not enlarged and not tender.   Musculoskeletal: He exhibits no edema.        Right knee: He exhibits normal range of motion, no swelling, no effusion and normal meniscus. No tenderness found.        Left knee: He exhibits normal range of motion, no swelling, no effusion, normal patellar mobility and normal meniscus. No tenderness found.        Cervical back: He exhibits decreased range of motion (minimal in all directions.). He exhibits no tenderness, no bony tenderness, no edema, no deformity and no pain.     Vascular Status -  His right foot exhibits normal foot vasculature  and no edema. His left foot exhibits normal foot vasculature  and no edema.  Skin Integrity  -  His right foot skin is intact.His left foot skin is intact..  Lymphadenopathy:     He has no cervical adenopathy.     He has no axillary adenopathy.        Right: No inguinal adenopathy present.        Left: No inguinal adenopathy present.   Neurological: He is alert and oriented to person, place, and time. He has normal strength and normal reflexes. He displays no tremor. No cranial nerve deficit or sensory deficit. He exhibits normal muscle tone. Gait normal.   Skin: Skin is warm, dry and intact. No rash noted.   Psychiatric: He has a normal mood and affect. His speech is normal and behavior is normal. Thought content normal. Cognition and memory are normal.   Vitals reviewed.      Assessment/ Plan  Diagnoses  and all orders for this visit:    Healthcare maintenance  -     Flucelvax Quad=>4Years ([id number])    Vitamin D deficiency    Family hx of colon cancer    Lindquist syndrome    Environmental allergies    Elevated ALT measurement        Return in about 1 year (around 12/16/2020) for Annual physical.      Discussion:  Oamr Ha is a 49 y.o. male RTC in yearly CPE, review of medical issues:    1. Low back pain resolved after stopped P90X; cervical neck pain in 11/2019 - resolved with rest and stretches. Advised regular exercise and stretches daily.   2. FHx of colon cancer, new dx of Lindquist Syndrome, dx by Dr. Villasenor in genetics and has dx of Lindquist Syndrome - Will get C-scope with EGD next month with Dr. Yen.  Will proceed with annual U/A, neuro exam with low threshold for MRI brain for any sx. PSA and exam (-) for prostate CA screen today.  C/W annual skin exam with derm.    3. Hx of Nephrolithiasis - occurred 10 years ago, passed. No recurrence. U/A clear today.  4. Vitamin D deficiency - persists.  Take 1000 I.U. Vitamin D3 OTC daily, no missed doses.  5. External hemorrhoids - recent flare with bleeding.  Advised daily fiber.  Sitz baths and PrepH if any flares.  Re-exam if any pain or persistent blood.   6. Environmental Alleriges - long hx, mild sx.  Claritin PRN OK.  7. ALT elevation - suspect is due to fatty liver with weight gain. Weight loss advised. Will trend numbers.   8. HM - labs d/w pt; Flu - today; Tdap/ Hep A - UTD;  Shingrix at pharmacy; C-scope as above; BARON/ PSA yearly, OK today; add back exercise for weight loss.

## 2020-07-30 ENCOUNTER — AMBULATORY SURGICAL CENTER (OUTPATIENT)
Dept: URBAN - METROPOLITAN AREA SURGERY 20 | Facility: SURGERY | Age: 50
End: 2020-07-30
Payer: COMMERCIAL

## 2020-07-30 ENCOUNTER — OFFICE (OUTPATIENT)
Dept: URBAN - METROPOLITAN AREA PATHOLOGY 4 | Facility: PATHOLOGY | Age: 50
End: 2020-07-30

## 2020-07-30 DIAGNOSIS — K31.89 OTHER DISEASES OF STOMACH AND DUODENUM: ICD-10-CM

## 2020-07-30 DIAGNOSIS — Z15.09 GENETIC SUSCEPTIBILITY TO OTHER MALIGNANT NEOPLASM: ICD-10-CM

## 2020-07-30 DIAGNOSIS — Z80.0 FAMILY HISTORY OF MALIGNANT NEOPLASM OF DIGESTIVE ORGANS: ICD-10-CM

## 2020-07-30 DIAGNOSIS — Z12.11 ENCOUNTER FOR SCREENING FOR MALIGNANT NEOPLASM OF COLON: ICD-10-CM

## 2020-07-30 DIAGNOSIS — K44.9 DIAPHRAGMATIC HERNIA WITHOUT OBSTRUCTION OR GANGRENE: ICD-10-CM

## 2020-07-30 DIAGNOSIS — D37.1 NEOPLASM OF UNCERTAIN BEHAVIOR OF STOMACH: ICD-10-CM

## 2020-07-30 PROBLEM — Z80.9 FAMILY HISTORY OF COLON CANCER: Status: ACTIVE | Noted: 2020-07-30

## 2020-07-30 LAB
GI HISTOLOGY: A. SELECT: (no result)
GI HISTOLOGY: PDF REPORT: (no result)

## 2020-07-30 PROCEDURE — 45378 DIAGNOSTIC COLONOSCOPY: CPT | Performed by: INTERNAL MEDICINE

## 2020-07-30 PROCEDURE — 43239 EGD BIOPSY SINGLE/MULTIPLE: CPT | Performed by: INTERNAL MEDICINE

## 2020-07-30 PROCEDURE — 88305 TISSUE EXAM BY PATHOLOGIST: CPT | Performed by: INTERNAL MEDICINE

## 2020-12-14 DIAGNOSIS — Z11.59 NEED FOR HEPATITIS C SCREENING TEST: ICD-10-CM

## 2020-12-14 DIAGNOSIS — E55.9 VITAMIN D DEFICIENCY: ICD-10-CM

## 2020-12-14 DIAGNOSIS — Z00.00 HEALTHCARE MAINTENANCE: Primary | ICD-10-CM

## 2020-12-16 LAB
25(OH)D3+25(OH)D2 SERPL-MCNC: 25.7 NG/ML (ref 30–100)
ALBUMIN SERPL-MCNC: 4.9 G/DL (ref 3.5–5.2)
ALBUMIN/GLOB SERPL: 2 G/DL
ALP SERPL-CCNC: 51 U/L (ref 39–117)
ALT SERPL-CCNC: 61 U/L (ref 1–41)
APPEARANCE UR: CLEAR
AST SERPL-CCNC: 30 U/L (ref 1–40)
BACTERIA #/AREA URNS HPF: NORMAL /HPF
BASOPHILS # BLD AUTO: 0.03 10*3/MM3 (ref 0–0.2)
BASOPHILS NFR BLD AUTO: 0.6 % (ref 0–1.5)
BILIRUB SERPL-MCNC: 0.8 MG/DL (ref 0–1.2)
BILIRUB UR QL STRIP: NEGATIVE
BUN SERPL-MCNC: 15 MG/DL (ref 6–20)
BUN/CREAT SERPL: 14.7 (ref 7–25)
CALCIUM SERPL-MCNC: 10.1 MG/DL (ref 8.6–10.5)
CHLORIDE SERPL-SCNC: 103 MMOL/L (ref 98–107)
CHOLEST SERPL-MCNC: 225 MG/DL (ref 0–200)
CO2 SERPL-SCNC: 29.3 MMOL/L (ref 22–29)
COLOR UR: YELLOW
CREAT SERPL-MCNC: 1.02 MG/DL (ref 0.76–1.27)
EOSINOPHIL # BLD AUTO: 0.13 10*3/MM3 (ref 0–0.4)
EOSINOPHIL NFR BLD AUTO: 2.5 % (ref 0.3–6.2)
EPI CELLS #/AREA URNS HPF: NORMAL /HPF (ref 0–10)
ERYTHROCYTE [DISTWIDTH] IN BLOOD BY AUTOMATED COUNT: 13.1 % (ref 12.3–15.4)
GLOBULIN SER CALC-MCNC: 2.4 GM/DL
GLUCOSE SERPL-MCNC: 104 MG/DL (ref 65–99)
GLUCOSE UR QL: NEGATIVE
HCT VFR BLD AUTO: 50.3 % (ref 37.5–51)
HCV AB S/CO SERPL IA: <0.1 S/CO RATIO (ref 0–0.9)
HDLC SERPL-MCNC: 51 MG/DL (ref 40–60)
HGB BLD-MCNC: 17 G/DL (ref 13–17.7)
HGB UR QL STRIP: NEGATIVE
IMM GRANULOCYTES # BLD AUTO: 0.01 10*3/MM3 (ref 0–0.05)
IMM GRANULOCYTES NFR BLD AUTO: 0.2 % (ref 0–0.5)
KETONES UR QL STRIP: NEGATIVE
LDLC SERPL CALC-MCNC: 138 MG/DL (ref 0–100)
LEUKOCYTE ESTERASE UR QL STRIP: NEGATIVE
LYMPHOCYTES # BLD AUTO: 1.31 10*3/MM3 (ref 0.7–3.1)
LYMPHOCYTES NFR BLD AUTO: 25.2 % (ref 19.6–45.3)
MCH RBC QN AUTO: 29.6 PG (ref 26.6–33)
MCHC RBC AUTO-ENTMCNC: 33.8 G/DL (ref 31.5–35.7)
MCV RBC AUTO: 87.6 FL (ref 79–97)
MICRO URNS: NORMAL
MICRO URNS: NORMAL
MONOCYTES # BLD AUTO: 0.51 10*3/MM3 (ref 0.1–0.9)
MONOCYTES NFR BLD AUTO: 9.8 % (ref 5–12)
MUCOUS THREADS URNS QL MICRO: PRESENT /HPF
NEUTROPHILS # BLD AUTO: 3.21 10*3/MM3 (ref 1.7–7)
NEUTROPHILS NFR BLD AUTO: 61.7 % (ref 42.7–76)
NITRITE UR QL STRIP: NEGATIVE
NRBC BLD AUTO-RTO: 0 /100 WBC (ref 0–0.2)
PH UR STRIP: 6 [PH] (ref 5–7.5)
PLATELET # BLD AUTO: 225 10*3/MM3 (ref 140–450)
POTASSIUM SERPL-SCNC: 4.5 MMOL/L (ref 3.5–5.2)
PROT SERPL-MCNC: 7.3 G/DL (ref 6–8.5)
PROT UR QL STRIP: NEGATIVE
RBC # BLD AUTO: 5.74 10*6/MM3 (ref 4.14–5.8)
RBC #/AREA URNS HPF: NORMAL /HPF (ref 0–2)
SODIUM SERPL-SCNC: 143 MMOL/L (ref 136–145)
SP GR UR: 1.02 (ref 1–1.03)
TRIGL SERPL-MCNC: 204 MG/DL (ref 0–150)
TSH SERPL DL<=0.005 MIU/L-ACNC: 2.79 UIU/ML (ref 0.27–4.2)
URINALYSIS REFLEX: NORMAL
UROBILINOGEN UR STRIP-MCNC: 0.2 MG/DL (ref 0.2–1)
VLDLC SERPL CALC-MCNC: 36 MG/DL (ref 5–40)
WBC # BLD AUTO: 5.2 10*3/MM3 (ref 3.4–10.8)
WBC #/AREA URNS HPF: NORMAL /HPF (ref 0–5)

## 2020-12-18 ENCOUNTER — OFFICE VISIT (OUTPATIENT)
Dept: INTERNAL MEDICINE | Facility: CLINIC | Age: 50
End: 2020-12-18

## 2020-12-18 VITALS
RESPIRATION RATE: 12 BRPM | HEART RATE: 84 BPM | HEIGHT: 70 IN | BODY MASS INDEX: 29.35 KG/M2 | DIASTOLIC BLOOD PRESSURE: 80 MMHG | OXYGEN SATURATION: 95 % | WEIGHT: 205 LBS | TEMPERATURE: 97.1 F | SYSTOLIC BLOOD PRESSURE: 130 MMHG

## 2020-12-18 DIAGNOSIS — M79.675 PAIN AND SWELLING OF TOE OF LEFT FOOT: ICD-10-CM

## 2020-12-18 DIAGNOSIS — R73.01 IFG (IMPAIRED FASTING GLUCOSE): ICD-10-CM

## 2020-12-18 DIAGNOSIS — M79.89 PAIN AND SWELLING OF TOE OF LEFT FOOT: ICD-10-CM

## 2020-12-18 DIAGNOSIS — Z80.0 FAMILY HX OF COLON CANCER: ICD-10-CM

## 2020-12-18 DIAGNOSIS — E55.9 VITAMIN D DEFICIENCY: ICD-10-CM

## 2020-12-18 DIAGNOSIS — Z00.00 HEALTHCARE MAINTENANCE: Primary | ICD-10-CM

## 2020-12-18 DIAGNOSIS — R74.01 ELEVATED ALT MEASUREMENT: ICD-10-CM

## 2020-12-18 DIAGNOSIS — F41.8 ANXIETY ABOUT HEALTH: ICD-10-CM

## 2020-12-18 DIAGNOSIS — M50.30 DDD (DEGENERATIVE DISC DISEASE), CERVICAL: ICD-10-CM

## 2020-12-18 DIAGNOSIS — M10.9 PODAGRA: ICD-10-CM

## 2020-12-18 DIAGNOSIS — Z15.09 LYNCH SYNDROME: ICD-10-CM

## 2020-12-18 DIAGNOSIS — Z91.09 ENVIRONMENTAL ALLERGIES: ICD-10-CM

## 2020-12-18 DIAGNOSIS — K64.4 EXTERNAL HEMORRHOIDS: ICD-10-CM

## 2020-12-18 LAB
HBA1C MFR BLD: 5.3 % (ref 4.8–5.6)
Lab: NORMAL
URATE SERPL-MCNC: 6.8 MG/DL (ref 3.4–7)
WRITTEN AUTHORIZATION: NORMAL

## 2020-12-18 PROCEDURE — 99213 OFFICE O/P EST LOW 20 MIN: CPT | Performed by: INTERNAL MEDICINE

## 2020-12-18 PROCEDURE — 99396 PREV VISIT EST AGE 40-64: CPT | Performed by: INTERNAL MEDICINE

## 2020-12-18 PROCEDURE — 73630 X-RAY EXAM OF FOOT: CPT | Performed by: INTERNAL MEDICINE

## 2020-12-18 RX ORDER — INDOMETHACIN 50 MG/1
50 CAPSULE ORAL
Qty: 21 CAPSULE | Refills: 0 | Status: SHIPPED | OUTPATIENT
Start: 2020-12-18 | End: 2020-12-25

## 2020-12-18 RX ORDER — COLCHICINE 0.6 MG/1
TABLET ORAL
Qty: 3 TABLET | Refills: 0 | Status: SHIPPED | OUTPATIENT
Start: 2020-12-18 | End: 2022-01-12

## 2020-12-18 RX ORDER — MELATONIN
1000 DAILY
Start: 2020-12-18

## 2020-12-18 NOTE — PROGRESS NOTES
"Annual Exam (review of medical issues )      HPI  Omar Ha is a 50 y.o. male RTC In yearly CPE, review of medical issues:   Notes has been doing well.  Went to bed Monday night this past week and was fine. Woke in AM with swollen L foot and was throbbing and swollen.  No weight bearing due to pain.  Not improved, so used boot from daughters injury and has been a bit better.  No fevers.  No rash. Midl erythema and puffiness.  \"it is my big toe and up the bridge of the foot'. Similar event never happened in past.   Recalls had issues with neck pain last year in middle of night.  That resolved.  Had some issues with low back pain and 'pop' in 2/2020 and took 4 days to get better. Wonders 'if falling apart'.     1. Low back pain resolved after stopped P90X; cervical neck pain in 11/2019 - resolved with rest and stretches. Pt is not exercising now and is not stretching. Is fearful of what he did and any reccurence.  2. 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 Told OK and needed in 2 years on f/u.   3. Hx of Nephrolithiasis - occurred 10 years ago, passed. No recurrence.  4. Vitamin D deficiency - on some Vit D daily, but not the recommended 1000 I.U. Vitamin D3 OTC daily.   5. External hemorrhoids - really no issue since last year. Did short term fiber and stopped, 'it just fell off the radar'.  Really no bleeding.   6. Environmental Alleriges - seasonal, mild. NO issues at this time.     Review of Systems   Constitution: Positive for weight gain (less active, working from home. ). Negative for chills, fever, malaise/fatigue and weight loss.   HENT: Negative for congestion, hearing loss, odynophagia and sore throat.    Eyes: Negative for discharge, double vision, pain and redness.        Last eye exam 12/2020; wears glasses     Cardiovascular: Negative for chest pain, dyspnea on exertion, irregular heartbeat, leg " swelling, near-syncope, palpitations and syncope.   Respiratory: Positive for snoring (wife notes snoring issue, positional). Negative for cough, shortness of breath and sleep disturbances due to breathing.    Endocrine: Negative for polydipsia, polyphagia and polyuria.   Hematologic/Lymphatic: Negative for bleeding problem. Does not bruise/bleed easily.   Skin: Negative for rash and suspicious lesions.   Musculoskeletal: Positive for joint pain (L foot at big toe). Negative for arthritis, back pain, joint swelling, muscle cramps, muscle weakness, myalgias and neck pain.   Gastrointestinal: Negative for constipation, diarrhea, dysphagia, heartburn, nausea and vomiting.   Genitourinary: Positive for nocturia (0-1x/ night). Negative for dysuria, frequency, hematuria, hesitancy and incomplete emptying.   Neurological: Negative for excessive daytime sleepiness, dizziness, headaches, light-headedness and vertigo (resolved, no recurrence since 2/2020. Resolved with [facility]. ).   Psychiatric/Behavioral: Negative for depression. The patient does not have insomnia (variable at times with stress at work.\) and is not nervous/anxious.    Allergic/Immunologic: Negative for environmental allergies (seasonal, no issues right now) and persistent infections.       Problem List:    Patient Active Problem List   Diagnosis   • Family hx of colon cancer   • Lindquist syndrome   • Vitamin D deficiency   • Environmental allergies   • External hemorrhoids   • IFG (impaired fasting glucose)   • DDD (degenerative disc disease), cervical       Medical History:    Past Medical History:   Diagnosis Date   • Chronic bilateral low back pain without sciatica 12/4/2017   • Environmental allergies 12/16/2019   • External hemorrhoids 12/16/2019    Hx bleeding in 2019   • Nephrolithiasis 2007    unknown type of stone   • Vitamin D deficiency 12/11/2018        Social History:    Social History     Socioeconomic History   • Marital status:      Spouse  "name: Not on file   • Number of children: 3   • Years of education: Not on file   • Highest education level: Not on file   Occupational History   • Occupation:      Comment: [employer]   Tobacco Use   • Smoking status: Never Smoker   • Smokeless tobacco: Never Used   Substance and Sexual Activity   • Alcohol use: Yes     Comment: 2 drinks/ month   • Drug use: No   • Sexual activity: Yes     Partners: Female     Comment: wife only; no hx STD's   Lifestyle   • Physical activity     Days per week: 0 days     Minutes per session: 0 min   • Stress: Not on file   Social History Narrative    Diet - healthy overall, eats fruits and vegetables; 2018 \"loose\" Paleo diet 3-4 months    Exercise - P90X in past; none in 2020, has new treadmill    Caffeine - tea, decaf       Family History:   Family History   Problem Relation Age of Onset   • Mitral valve prolapse Mother    • Uterine cancer Mother    • Colon cancer Mother         Lindquist Syndrome   • Hypertension Mother    • Hypothyroidism Mother    • Melanoma Mother    • Squamous cell carcinoma Mother    • Leukemia Father         CLL   • Skin cancer Father    • Bell's palsy Father    • Hypertension Father    • Hyperlipidemia Father    • Heart failure Father    • Heart disease Father         CABG x 4   • Skin cancer Sister    • Colon cancer Brother         Lindquist Syndrome   • No Known Problems Daughter    • No Known Problems Son    • No Known Problems Daughter        Surgical History:   Past Surgical History:   Procedure Laterality Date   • PATELLA FRACTURE SURGERY Right 1986         Current Outpatient Medications:   •  cholecalciferol (VITAMIN D3) 25 MCG (1000 UT) tablet, Take 1 tablet by mouth Daily., Disp:  , Rfl:   •  colchicine 0.6 MG tablet, Take 2 tabs PO now and then 1 tab PO one hour later, Disp: 3 tablet, Rfl: 0  •  indomethacin (INDOCIN) 50 MG capsule, Take 1 capsule by mouth 3 (Three) Times a Day With Meals for 7 days., Disp: 21 capsule, Rfl: 0    Vitals:    " 12/18/20 1311   BP: 130/80   Pulse: 84   Resp: 12   Temp: 97.1 °F (36.2 °C)   SpO2: 95%     Body mass index is 29.41 kg/m².    Physical Exam  Vitals signs reviewed.   Constitutional:       General: He is not in acute distress.     Appearance: Normal appearance. He is well-developed. He is not ill-appearing or toxic-appearing.   HENT:      Head: Normocephalic and atraumatic.      Right Ear: Hearing, tympanic membrane, ear canal and external ear normal.      Left Ear: Hearing, tympanic membrane, ear canal and external ear normal.      Nose: Nose normal.      Mouth/Throat:      Mouth: Mucous membranes are moist. No oral lesions.      Tongue: No lesions.      Pharynx: Oropharynx is clear. Uvula midline. No pharyngeal swelling, oropharyngeal exudate, posterior oropharyngeal erythema or uvula swelling.   Eyes:      General: Lids are normal. No scleral icterus.        Right eye: No discharge.         Left eye: No discharge.      Extraocular Movements: Extraocular movements intact.      Conjunctiva/sclera: Conjunctivae normal.      Pupils: Pupils are equal, round, and reactive to light.   Neck:      Musculoskeletal: Full passive range of motion without pain, normal range of motion and neck supple.      Thyroid: No thyroid mass or thyromegaly.      Vascular: No carotid bruit.   Cardiovascular:      Rate and Rhythm: Normal rate and regular rhythm.      Pulses:           Radial pulses are 2+ on the right side and 2+ on the left side.        Dorsalis pedis pulses are 2+ on the right side and 2+ on the left side.        Posterior tibial pulses are 2+ on the right side and 2+ on the left side.      Heart sounds: Normal heart sounds, S1 normal and S2 normal. No murmur. No friction rub. No gallop.    Pulmonary:      Effort: Pulmonary effort is normal. No respiratory distress.      Breath sounds: Normal breath sounds. No wheezing, rhonchi or rales.   Abdominal:      General: Bowel sounds are normal. There is no distension.       Palpations: Abdomen is soft. There is no mass.      Tenderness: There is no abdominal tenderness. There is no guarding or rebound.   Genitourinary:     Prostate: Normal. Not enlarged and not tender.      Rectum: Normal. No external hemorrhoid. Normal anal tone.   Musculoskeletal:         General: No deformity.      Right lower leg: No edema.      Left lower leg: No edema.      Right foot: Normal range of motion. No deformity or bunion.      Left foot: Decreased range of motion (flexion at great toe at MTP due to pain/swelling). Bony tenderness (at L great MTP) and swelling (focused over L great MTP) present. No deformity or bunion.   Feet:      Right foot:      Skin integrity: No ulcer, blister or skin breakdown.      Left foot:      Skin integrity: Erythema (over great toe MTP joint) present. No ulcer, blister or skin breakdown.   Lymphadenopathy:      Cervical: No cervical adenopathy.      Right cervical: No superficial, deep or posterior cervical adenopathy.     Left cervical: No superficial, deep or posterior cervical adenopathy.      Upper Body:      Right upper body: No supraclavicular, axillary or pectoral adenopathy.      Left upper body: No supraclavicular, axillary or pectoral adenopathy.   Skin:     General: Skin is warm and dry.      Findings: No rash.   Neurological:      Mental Status: He is alert and oriented to person, place, and time.      Cranial Nerves: No cranial nerve deficit.      Sensory: No sensory deficit.      Motor: No weakness, tremor, atrophy or abnormal muscle tone.      Gait: Gait normal.      Deep Tendon Reflexes: Reflexes are normal and symmetric.      Reflex Scores:       Patellar reflexes are 2+ on the right side and 2+ on the left side.       Achilles reflexes are 2+ on the right side and 2+ on the left side.  Psychiatric:         Attention and Perception: Attention normal.         Mood and Affect: Mood normal.         Speech: Speech normal.         Behavior: Behavior normal.  Behavior is cooperative.         Thought Content: Thought content normal.     XR L foot: Pain, swelling, acute at L foot x 4 days; No prior for comparison  No fracture  Normal alignment  Mild joint space narrowing at DIP joints.       Assessment/ Plan  Diagnoses and all orders for this visit:    Healthcare maintenance  -     US Abdomen Limited; Future    External hemorrhoids    Vitamin D deficiency    Environmental allergies    Family hx of colon cancer    Lindquist syndrome    Pain and swelling of toe of left foot  -     XR Foot 3+ View Left (In Office)  -     Uric Acid    IFG (impaired fasting glucose)  -     US Abdomen Limited; Future  -     Hemoglobin A1c    Elevated ALT measurement  -     US Abdomen Limited; Future    Anxiety about health    DDD (degenerative disc disease), cervical    Podagra  -     colchicine 0.6 MG tablet; Take 2 tabs PO now and then 1 tab PO one hour later  -     indomethacin (INDOCIN) 50 MG capsule; Take 1 capsule by mouth 3 (Three) Times a Day With Meals for 7 days.    Other orders  -     Discontinue: VITAMIN D PO; Take  by mouth.  -     cholecalciferol (VITAMIN D3) 25 MCG (1000 UT) tablet; Take 1 tablet by mouth Daily.        Return in about 6 months (around 6/18/2021) for Annual physical, Recheck.      Discussion:  Omar Ha is a 50 y.o. male RTC In yearly CPE, review of medical issues:    1. Left foot pain,focused at L great toe MTP, acute/ new issue - hx and exam c/w gout.  X-ray (-) fx. I d/w pt likelihood of gout, associated issues including fatty liver (presumed), weight gain, and IFG.  Diet handout provided.  Will have pt complete low dose colchicine regimen and short course NSAIDs. Call if not better. Uric acid added to labs, will f/u with pt. Weight loss and diet mods strongly advised.   2. Low back pain resolved after stopped P90X in past; cervical neck pain in 11/2019, resolved in < 1 week - Xrays in past with minimal degeneration of joints noted.  Pt worried well about  recurrence, but I think most of this has to do with inactivity, lack of stretching, and instances of acute muscle strain. Reassured pt overall today.   3. 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 eYn, intestinal metaplasia without dysplasia noted in stomach, no polyps.  Repeat EGD/ C-scope in 2 years.   4. Hx of Nephrolithiasis - occurred 10 years ago, passed. No recurrence. U/A clear today.  5. Vitamin D deficiency - persists, restart on 1000 I.U. Vitamin D3 OTC daily.   6. External hemorrhoids - RACIEL. Restart daily statin.   7. Environmental Alleriges - long hx, mild sx, seasonal.  Claritin PRN OK.  8. ALT elevation - progressive, suspect is due to fatty liver with weight gain again this year.  Will get RUQ U/S to eval, hold on full serologic w/u at this time. Weight loss advised. Will trend numbers.   9. IFG - noted on labs, weight gain noted with decline in activity. Add A1C to labs. Weight loss with regular exercise and calorie reduction advised.   10. HM - labs d/w pt; Flu/ Shingrix -at pharmacy; Tdap/ Hep A - UTD;  C-scope 7/2020 (-) --> 2 years; BARON/ PSA OK today; Hep C Ab (-) 12/2020; add back exercise for weight loss.     RTC 6 months, F labs prior (CMP, A1C, uric acid)

## 2020-12-31 ENCOUNTER — HOSPITAL ENCOUNTER (OUTPATIENT)
Dept: ULTRASOUND IMAGING | Facility: HOSPITAL | Age: 50
Discharge: HOME | End: 2020-12-31
Admitting: INTERNAL MEDICINE

## 2020-12-31 DIAGNOSIS — R73.01 IFG (IMPAIRED FASTING GLUCOSE): ICD-10-CM

## 2020-12-31 DIAGNOSIS — Z00.00 HEALTHCARE MAINTENANCE: ICD-10-CM

## 2020-12-31 DIAGNOSIS — R74.01 ELEVATED ALT MEASUREMENT: ICD-10-CM

## 2020-12-31 PROBLEM — K76.0 FATTY LIVER: Status: ACTIVE | Noted: 2020-12-31

## 2020-12-31 PROCEDURE — 76705 ECHO EXAM OF ABDOMEN: CPT

## 2021-01-07 ENCOUNTER — APPOINTMENT (OUTPATIENT)
Dept: ULTRASOUND IMAGING | Facility: HOSPITAL | Age: 51
End: 2021-01-07

## 2021-03-30 ENCOUNTER — BULK ORDERING (OUTPATIENT)
Dept: CASE MANAGEMENT | Facility: OTHER | Age: 51
End: 2021-03-30

## 2021-03-30 DIAGNOSIS — Z23 IMMUNIZATION DUE: ICD-10-CM

## 2021-06-08 DIAGNOSIS — Z15.09 LYNCH SYNDROME: ICD-10-CM

## 2021-06-08 DIAGNOSIS — E55.9 VITAMIN D DEFICIENCY: ICD-10-CM

## 2021-06-08 DIAGNOSIS — R73.01 IFG (IMPAIRED FASTING GLUCOSE): ICD-10-CM

## 2021-06-08 DIAGNOSIS — Z00.00 HEALTHCARE MAINTENANCE: ICD-10-CM

## 2021-06-08 DIAGNOSIS — K76.0 FATTY LIVER: Primary | ICD-10-CM

## 2021-06-16 LAB
25(OH)D3+25(OH)D2 SERPL-MCNC: 40.1 NG/ML (ref 30–100)
ALBUMIN SERPL-MCNC: 4.9 G/DL (ref 3.5–5.2)
ALBUMIN/GLOB SERPL: 2 G/DL
ALP SERPL-CCNC: 54 U/L (ref 39–117)
ALT SERPL-CCNC: 40 U/L (ref 1–41)
APPEARANCE UR: CLEAR
AST SERPL-CCNC: 26 U/L (ref 1–40)
BACTERIA #/AREA URNS HPF: NORMAL /HPF
BASOPHILS # BLD AUTO: 0.04 10*3/MM3 (ref 0–0.2)
BASOPHILS NFR BLD AUTO: 0.9 % (ref 0–1.5)
BILIRUB SERPL-MCNC: 0.9 MG/DL (ref 0–1.2)
BILIRUB UR QL STRIP: NEGATIVE
BUN SERPL-MCNC: 16 MG/DL (ref 6–20)
BUN/CREAT SERPL: 15.1 (ref 7–25)
CALCIUM SERPL-MCNC: 10.1 MG/DL (ref 8.6–10.5)
CASTS URNS QL MICRO: NORMAL /LPF
CHLORIDE SERPL-SCNC: 104 MMOL/L (ref 98–107)
CHOLEST SERPL-MCNC: 218 MG/DL (ref 0–200)
CHOLEST/HDLC SERPL: 3.82 {RATIO}
CO2 SERPL-SCNC: 29.7 MMOL/L (ref 22–29)
COLOR UR: YELLOW
CREAT SERPL-MCNC: 1.06 MG/DL (ref 0.76–1.27)
EOSINOPHIL # BLD AUTO: 0.15 10*3/MM3 (ref 0–0.4)
EOSINOPHIL NFR BLD AUTO: 3.2 % (ref 0.3–6.2)
EPI CELLS #/AREA URNS HPF: NORMAL /HPF (ref 0–10)
ERYTHROCYTE [DISTWIDTH] IN BLOOD BY AUTOMATED COUNT: 12.7 % (ref 12.3–15.4)
GLOBULIN SER CALC-MCNC: 2.4 GM/DL
GLUCOSE SERPL-MCNC: 92 MG/DL (ref 65–99)
GLUCOSE UR QL: NEGATIVE
HBA1C MFR BLD: 5.3 % (ref 4.8–5.6)
HCT VFR BLD AUTO: 48.4 % (ref 37.5–51)
HDLC SERPL-MCNC: 57 MG/DL (ref 40–60)
HGB BLD-MCNC: 16.6 G/DL (ref 13–17.7)
HGB UR QL STRIP: NEGATIVE
IMM GRANULOCYTES # BLD AUTO: 0.01 10*3/MM3 (ref 0–0.05)
IMM GRANULOCYTES NFR BLD AUTO: 0.2 % (ref 0–0.5)
KETONES UR QL STRIP: NEGATIVE
LDLC SERPL CALC-MCNC: 136 MG/DL (ref 0–100)
LEUKOCYTE ESTERASE UR QL STRIP: NEGATIVE
LYMPHOCYTES # BLD AUTO: 1.28 10*3/MM3 (ref 0.7–3.1)
LYMPHOCYTES NFR BLD AUTO: 27.7 % (ref 19.6–45.3)
MCH RBC QN AUTO: 30.6 PG (ref 26.6–33)
MCHC RBC AUTO-ENTMCNC: 34.3 G/DL (ref 31.5–35.7)
MCV RBC AUTO: 89.1 FL (ref 79–97)
MICRO URNS: NORMAL
MICRO URNS: NORMAL
MONOCYTES # BLD AUTO: 0.45 10*3/MM3 (ref 0.1–0.9)
MONOCYTES NFR BLD AUTO: 9.7 % (ref 5–12)
NEUTROPHILS # BLD AUTO: 2.69 10*3/MM3 (ref 1.7–7)
NEUTROPHILS NFR BLD AUTO: 58.3 % (ref 42.7–76)
NITRITE UR QL STRIP: NEGATIVE
NRBC BLD AUTO-RTO: 0 /100 WBC (ref 0–0.2)
PH UR STRIP: 6 [PH] (ref 5–7.5)
PLATELET # BLD AUTO: 225 10*3/MM3 (ref 140–450)
POTASSIUM SERPL-SCNC: 4.2 MMOL/L (ref 3.5–5.2)
PROT SERPL-MCNC: 7.3 G/DL (ref 6–8.5)
PROT UR QL STRIP: NEGATIVE
RBC # BLD AUTO: 5.43 10*6/MM3 (ref 4.14–5.8)
RBC #/AREA URNS HPF: NORMAL /HPF (ref 0–2)
SODIUM SERPL-SCNC: 146 MMOL/L (ref 136–145)
SP GR UR: 1.02 (ref 1–1.03)
TRIGL SERPL-MCNC: 140 MG/DL (ref 0–150)
TSH SERPL DL<=0.005 MIU/L-ACNC: 2.1 UIU/ML (ref 0.27–4.2)
URATE SERPL-MCNC: 7.6 MG/DL (ref 3.4–7)
URINALYSIS REFLEX: NORMAL
UROBILINOGEN UR STRIP-MCNC: 0.2 MG/DL (ref 0.2–1)
VLDLC SERPL CALC-MCNC: 25 MG/DL (ref 5–40)
WBC # BLD AUTO: 4.62 10*3/MM3 (ref 3.4–10.8)
WBC #/AREA URNS HPF: NORMAL /HPF (ref 0–5)

## 2021-06-18 ENCOUNTER — OFFICE VISIT (OUTPATIENT)
Dept: INTERNAL MEDICINE | Facility: CLINIC | Age: 51
End: 2021-06-18

## 2021-06-18 VITALS
RESPIRATION RATE: 20 BRPM | HEART RATE: 71 BPM | SYSTOLIC BLOOD PRESSURE: 128 MMHG | BODY MASS INDEX: 28.35 KG/M2 | OXYGEN SATURATION: 97 % | HEIGHT: 71 IN | DIASTOLIC BLOOD PRESSURE: 60 MMHG | TEMPERATURE: 97.1 F | WEIGHT: 202.5 LBS

## 2021-06-18 DIAGNOSIS — R73.01 IFG (IMPAIRED FASTING GLUCOSE): Primary | ICD-10-CM

## 2021-06-18 DIAGNOSIS — Z00.00 HEALTHCARE MAINTENANCE: ICD-10-CM

## 2021-06-18 DIAGNOSIS — E55.9 VITAMIN D DEFICIENCY: ICD-10-CM

## 2021-06-18 DIAGNOSIS — Z15.09 LYNCH SYNDROME: ICD-10-CM

## 2021-06-18 DIAGNOSIS — Z91.09 ENVIRONMENTAL ALLERGIES: ICD-10-CM

## 2021-06-18 DIAGNOSIS — K76.0 FATTY LIVER: ICD-10-CM

## 2021-06-18 PROCEDURE — 99214 OFFICE O/P EST MOD 30 MIN: CPT | Performed by: INTERNAL MEDICINE

## 2021-06-18 NOTE — PROGRESS NOTES
Follow-up (6 month fup lab review ), Elevated Hepatic Enzymes, and Allergies      HPI  Omar Ha is a 50 y.o. male RTC in f/u:  Has been doing well overall.  Health has been good.   1. Left foot pain,focused at L great toe MTP, acute/ new issue - hx and exam c/w gout.  Has pain 'on occasion' that is mild and fleeting.  No swelling.   2. 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 Villasenor in genetics and has dx of Lindquist Syndrome - C-scope with EGD 7/2020 per Dr. Yen, intestinal metaplasia without dysplasia noted in stomach, no polyps. Aware of results and f/u need.   3. Vitamin D deficiency - is now taking 1000 I.U. Vitamin D3 OTC daily. Has been consistent with use.  4. Environmental Alleriges - long hx, mild sx, seasonal.  Uses Claritin PRN. Itchy eyes are main sx.  Will take med PRN now.   5. ALT elevation - US showed fatty liver. Pt has been working on weigh tloss. Cut back on snacking now.  Feels like down about 10#.  Really has made efforts.   6. IFG - has increased exercise.  Is eating less snacks. Weight is down.   7. HM - not had Shingrix at pharmacy; did get COVID vaccine.     Review of Systems   Constitutional: Positive for weight loss (intentional). Negative for chills and fever.   Cardiovascular: Negative for chest pain, dyspnea on exertion, irregular heartbeat, near-syncope, palpitations and syncope.   Respiratory: Negative for shortness of breath.    Musculoskeletal: Negative for gout (no recurrence at toe.  Desires to avoid meds if able).   Gastrointestinal: Negative for nausea and vomiting.   Neurological: Negative for dizziness and light-headedness.   Allergic/Immunologic: Positive for environmental allergies (overall controlled on PRN OTC med).       The following portions of the patient's history were reviewed and updated as appropriate: allergies, current medications, past medical history, past social history and problem list.      Current Outpatient Medications:   •   "cholecalciferol (VITAMIN D3) 25 MCG (1000 UT) tablet, Take 1 tablet by mouth Daily., Disp:  , Rfl:   •  colchicine 0.6 MG tablet, Take 2 tabs PO now and then 1 tab PO one hour later, Disp: 3 tablet, Rfl: 0    Vitals:    06/18/21 0810   BP: 128/60   BP Location: Left arm   Patient Position: Sitting   Pulse: 71   Resp: 20   Temp: 97.1 °F (36.2 °C)   TempSrc: Temporal   SpO2: 97%   Weight: 91.9 kg (202 lb 8 oz)   Height: 179.1 cm (70.5\")     Body mass index is 28.65 kg/m².      Physical Exam  Vitals reviewed.   Constitutional:       General: He is not in acute distress.     Appearance: He is well-developed. He is not ill-appearing or toxic-appearing.   HENT:      Head: Normocephalic and atraumatic.      Mouth/Throat:      Mouth: Mucous membranes are moist. No oral lesions.      Tongue: No lesions.      Pharynx: Oropharynx is clear. No pharyngeal swelling, oropharyngeal exudate, posterior oropharyngeal erythema or uvula swelling.   Eyes:      General: No scleral icterus.     Conjunctiva/sclera: Conjunctivae normal.      Pupils: Pupils are equal, round, and reactive to light.   Neck:      Vascular: No carotid bruit.   Cardiovascular:      Rate and Rhythm: Normal rate and regular rhythm.      Pulses:           Carotid pulses are 2+ on the right side and 2+ on the left side.       Radial pulses are 2+ on the right side and 2+ on the left side.      Heart sounds: Normal heart sounds.   Pulmonary:      Effort: Pulmonary effort is normal. No respiratory distress.      Breath sounds: Normal breath sounds. No wheezing, rhonchi or rales.   Musculoskeletal:      Cervical back: Normal range of motion and neck supple. No muscular tenderness.      Right lower leg: No edema.      Left lower leg: No edema.   Lymphadenopathy:      Cervical: No cervical adenopathy.   Neurological:      Mental Status: He is alert and oriented to person, place, and time.      Cranial Nerves: No cranial nerve deficit.      Gait: Gait normal.   Psychiatric: "         Attention and Perception: Attention normal.         Mood and Affect: Mood and affect normal.         Behavior: Behavior normal.         Thought Content: Thought content normal.         Assessment/ Plan  Diagnoses and all orders for this visit:    IFG (impaired fasting glucose)    Fatty liver    Environmental allergies    Vitamin D deficiency    Lindquist syndrome    Healthcare maintenance        Return in about 6 months (around 12/18/2021) for Annual physical.      Discussion:  Omar Ha is a 50 y.o. male RTC in f/u:    1. L great toe MTP pain, acute/ new issue last visit - hx and exam c/w gout last visit. Resolved with Colchicine. Uric acid mild elevation but pt has made TLC mods and no recurrence of sx. Prefers to avoid daily meds if able. Will monitor with watchful waiting. C/W TLC.   2. 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 Villasenor in genetics and has dx of Lindquist Syndrome - C-scope with EGD 7/2020 per Dr. Yen, intestinal metaplasia without dysplasia noted in stomach, no polyps.  Repeat EGD/ C-scope in 7/2022.  3. Vitamin D deficiency - replete with compliance on 1000 I.U. Vitamin D3 OTC daily.   4. Environmental Alleriges -Claritin PRN. Ocular sx main issue.  5. Fatty liver/ ALT elevation - US showed fatty liver after last visit. Pt made TLC mods, including resolving insulin resistance issue and ALT now normalized.  C/W TLC mods.   6. IFG - resolved today, A1C normal with TLC mods. Congratulated pt and focused discussion on compliance long term with TLC.  Trend numbers.   7. HM -  Shingrix -at pharmacy; COVID completed.     RTC 6 months CPE, F labs prior (CBC, CMP, TSH, A1C, U/A, FLP, Vit D, uric acid prior)

## 2021-12-29 DIAGNOSIS — R73.01 IFG (IMPAIRED FASTING GLUCOSE): ICD-10-CM

## 2021-12-29 DIAGNOSIS — Z15.09 LYNCH SYNDROME: ICD-10-CM

## 2021-12-29 DIAGNOSIS — K76.0 FATTY LIVER: ICD-10-CM

## 2021-12-29 DIAGNOSIS — Z12.5 SCREENING FOR PROSTATE CANCER: ICD-10-CM

## 2021-12-29 DIAGNOSIS — E55.9 VITAMIN D DEFICIENCY: ICD-10-CM

## 2021-12-29 DIAGNOSIS — Z00.00 HEALTHCARE MAINTENANCE: Primary | ICD-10-CM

## 2022-01-07 LAB
25(OH)D3+25(OH)D2 SERPL-MCNC: 35.8 NG/ML (ref 30–100)
ALBUMIN SERPL-MCNC: 4.7 G/DL (ref 3.8–4.9)
ALBUMIN/GLOB SERPL: 2 {RATIO} (ref 1.2–2.2)
ALP SERPL-CCNC: 54 IU/L (ref 44–121)
ALT SERPL-CCNC: 56 IU/L (ref 0–44)
APPEARANCE UR: CLEAR
AST SERPL-CCNC: 29 IU/L (ref 0–40)
BACTERIA #/AREA URNS HPF: NORMAL /[HPF]
BASOPHILS # BLD AUTO: 0 X10E3/UL (ref 0–0.2)
BASOPHILS NFR BLD AUTO: 1 %
BILIRUB SERPL-MCNC: 0.6 MG/DL (ref 0–1.2)
BILIRUB UR QL STRIP: NEGATIVE
BUN SERPL-MCNC: 15 MG/DL (ref 6–24)
BUN/CREAT SERPL: 14 (ref 9–20)
CALCIUM SERPL-MCNC: 9.8 MG/DL (ref 8.7–10.2)
CASTS URNS QL MICRO: NORMAL /LPF
CHLORIDE SERPL-SCNC: 104 MMOL/L (ref 96–106)
CHOLEST SERPL-MCNC: 211 MG/DL (ref 100–199)
CO2 SERPL-SCNC: 26 MMOL/L (ref 20–29)
COLOR UR: YELLOW
CREAT SERPL-MCNC: 1.06 MG/DL (ref 0.76–1.27)
EOSINOPHIL # BLD AUTO: 0.2 X10E3/UL (ref 0–0.4)
EOSINOPHIL NFR BLD AUTO: 3 %
EPI CELLS #/AREA URNS HPF: NORMAL /HPF (ref 0–10)
ERYTHROCYTE [DISTWIDTH] IN BLOOD BY AUTOMATED COUNT: 12.8 % (ref 11.6–15.4)
GLOBULIN SER CALC-MCNC: 2.4 G/DL (ref 1.5–4.5)
GLUCOSE SERPL-MCNC: 92 MG/DL (ref 65–99)
GLUCOSE UR QL: NEGATIVE
HBA1C MFR BLD: 5.3 % (ref 4.8–5.6)
HCT VFR BLD AUTO: 46.2 % (ref 37.5–51)
HDLC SERPL-MCNC: 49 MG/DL
HGB BLD-MCNC: 16.5 G/DL (ref 13–17.7)
HGB UR QL STRIP: NEGATIVE
IMM GRANULOCYTES # BLD AUTO: 0 X10E3/UL (ref 0–0.1)
IMM GRANULOCYTES NFR BLD AUTO: 0 %
KETONES UR QL STRIP: NEGATIVE
LDLC SERPL CALC-MCNC: 134 MG/DL (ref 0–99)
LEUKOCYTE ESTERASE UR QL STRIP: NEGATIVE
LYMPHOCYTES # BLD AUTO: 1.5 X10E3/UL (ref 0.7–3.1)
LYMPHOCYTES NFR BLD AUTO: 29 %
MCH RBC QN AUTO: 30.9 PG (ref 26.6–33)
MCHC RBC AUTO-ENTMCNC: 35.7 G/DL (ref 31.5–35.7)
MCV RBC AUTO: 87 FL (ref 79–97)
MICRO URNS: NORMAL
MICRO URNS: NORMAL
MONOCYTES # BLD AUTO: 0.5 X10E3/UL (ref 0.1–0.9)
MONOCYTES NFR BLD AUTO: 10 %
NEUTROPHILS # BLD AUTO: 3 X10E3/UL (ref 1.4–7)
NEUTROPHILS NFR BLD AUTO: 57 %
NITRITE UR QL STRIP: NEGATIVE
PH UR STRIP: 5 [PH] (ref 5–7.5)
PLATELET # BLD AUTO: 245 X10E3/UL (ref 150–450)
POTASSIUM SERPL-SCNC: 4.1 MMOL/L (ref 3.5–5.2)
PROT SERPL-MCNC: 7.1 G/DL (ref 6–8.5)
PROT UR QL STRIP: NEGATIVE
PSA SERPL-MCNC: 2 NG/ML (ref 0–4)
RBC # BLD AUTO: 5.34 X10E6/UL (ref 4.14–5.8)
RBC #/AREA URNS HPF: NORMAL /HPF (ref 0–2)
SODIUM SERPL-SCNC: 142 MMOL/L (ref 134–144)
SP GR UR: 1.02 (ref 1–1.03)
TRIGL SERPL-MCNC: 155 MG/DL (ref 0–149)
TSH SERPL DL<=0.005 MIU/L-ACNC: 2.02 UIU/ML (ref 0.45–4.5)
URINALYSIS REFLEX: NORMAL
UROBILINOGEN UR STRIP-MCNC: 0.2 MG/DL (ref 0.2–1)
VLDLC SERPL CALC-MCNC: 28 MG/DL (ref 5–40)
WBC # BLD AUTO: 5.3 X10E3/UL (ref 3.4–10.8)
WBC #/AREA URNS HPF: NORMAL /HPF (ref 0–5)

## 2022-01-12 ENCOUNTER — OFFICE VISIT (OUTPATIENT)
Dept: INTERNAL MEDICINE | Facility: CLINIC | Age: 52
End: 2022-01-12

## 2022-01-12 VITALS
HEIGHT: 70 IN | HEART RATE: 72 BPM | RESPIRATION RATE: 12 BRPM | DIASTOLIC BLOOD PRESSURE: 80 MMHG | SYSTOLIC BLOOD PRESSURE: 130 MMHG | BODY MASS INDEX: 29.63 KG/M2 | WEIGHT: 207 LBS | TEMPERATURE: 97.1 F | OXYGEN SATURATION: 99 %

## 2022-01-12 DIAGNOSIS — Z15.09 LYNCH SYNDROME: ICD-10-CM

## 2022-01-12 DIAGNOSIS — R29.818 SUSPECTED SLEEP APNEA: ICD-10-CM

## 2022-01-12 DIAGNOSIS — R73.01 IFG (IMPAIRED FASTING GLUCOSE): ICD-10-CM

## 2022-01-12 DIAGNOSIS — R06.83 SNORING: ICD-10-CM

## 2022-01-12 DIAGNOSIS — Z91.09 ENVIRONMENTAL ALLERGIES: ICD-10-CM

## 2022-01-12 DIAGNOSIS — K64.4 EXTERNAL HEMORRHOIDS: ICD-10-CM

## 2022-01-12 DIAGNOSIS — E66.3 OVERWEIGHT: ICD-10-CM

## 2022-01-12 DIAGNOSIS — Z80.0 FAMILY HX OF COLON CANCER: ICD-10-CM

## 2022-01-12 DIAGNOSIS — E55.9 VITAMIN D DEFICIENCY: ICD-10-CM

## 2022-01-12 DIAGNOSIS — K76.0 FATTY LIVER: ICD-10-CM

## 2022-01-12 DIAGNOSIS — Z00.00 HEALTHCARE MAINTENANCE: Primary | ICD-10-CM

## 2022-01-12 PROCEDURE — 99396 PREV VISIT EST AGE 40-64: CPT | Performed by: INTERNAL MEDICINE

## 2022-01-12 NOTE — PROGRESS NOTES
Annual Exam (review of medical issues )      HPI  Omar Ha is a 51 y.o. male RTC in yearly CPE, review of medical issues:  Has been doing well. 'Everything is good'.  Has bee nbusy renovating house, so was active but less formal exercise.   COVID exposure over [place] but was never positive on test. Had some mild sx, but at home test was negative.   1. L great toe MTP pain, acute/ new issue last visit - hx and exam c/w gout last visit. Resolved with Colchicine.  Really has only sx 'every now and then with faint touch but has never really flared up'.  No redness or swelling at toe joint.   2. Vitamin D deficiency - on 1000 I.U. Vitamin D3 OTC daily.   3. Environmental Alleriges - Claritin PRN. RACIEL in winter though can have some mild sx nearly all year. No clear seasonal pattern.   4. Fatty liver/ ALT elevation - Weight is up a few pounds.  Diet stable overall.   5. IFG - exercise has been off due to home renovation. Diet ahs 'been good'. Weight is up a few pounds.   6. External hemorrhoids - RACIEL. No bleeding, no itching, no pain.   7. Environmental Alleriges - long hx, mild sx, seasonal.  Claritin PRN OK.    Review of Systems   Constitutional: Positive for weight gain (a few pounds). Negative for chills (after booster, resolved), fever and malaise/fatigue.   HENT: Negative for congestion, hearing loss, odynophagia and sore throat.    Eyes: Negative for discharge, double vision, pain and redness.        Last eye exam 12/2021; wears contacts     Cardiovascular: Negative for chest pain, dyspnea on exertion, irregular heartbeat, leg swelling, near-syncope, palpitations and syncope.   Respiratory: Positive for snoring (wife notes pt snores, uses sleep lab and showed ~75% of night was snoring). Negative for cough, shortness of breath and sleep disturbances due to breathing.    Endocrine: Negative for polydipsia, polyphagia and polyuria.   Hematologic/Lymphatic: Negative for bleeding problem. Does not bruise/bleed  easily.   Skin: Negative for rash and suspicious lesions.   Musculoskeletal: Negative for joint pain, joint swelling, muscle cramps, muscle weakness and myalgias.   Gastrointestinal: Negative for constipation, diarrhea, dysphagia, heartburn (on occasion; ~1x/ month), nausea and vomiting.   Genitourinary: Negative for dysuria, frequency, hematuria, hesitancy and incomplete emptying.   Neurological: Positive for excessive daytime sleepiness (does not wake fully refreshed. No naps, but can nod off with computer work. ). Negative for dizziness, headaches and light-headedness.   Psychiatric/Behavioral: Negative for depression. The patient does not have insomnia (gets to sleep right away) and is not nervous/anxious.    Allergic/Immunologic: Positive for environmental allergies. Negative for persistent infections.       Problem List:    Patient Active Problem List   Diagnosis   • Family hx of colon cancer   • Lindquist syndrome   • Vitamin D deficiency   • Environmental allergies   • External hemorrhoids   • IFG (impaired fasting glucose)   • DDD (degenerative disc disease), cervical   • Fatty liver       Medical History:    Past Medical History:   Diagnosis Date   • Chronic bilateral low back pain without sciatica 12/4/2017   • Environmental allergies 12/16/2019   • External hemorrhoids 12/16/2019    Hx bleeding in 2019   • Nephrolithiasis 2007    unknown type of stone   • Vitamin D deficiency 12/11/2018        Social History:    Social History     Socioeconomic History   • Marital status:    • Number of children: 3   Tobacco Use   • Smoking status: Never Smoker   • Smokeless tobacco: Never Used   Vaping Use   • Vaping Use: Never used   Substance and Sexual Activity   • Alcohol use: Yes     Comment: 2-4 drinks/ month   • Drug use: No   • Sexual activity: Yes     Partners: Female     Comment: wife only; no hx STD's       Family History:   Family History   Problem Relation Age of Onset   • Mitral valve prolapse Mother     • Uterine cancer Mother    • Colon cancer Mother         Lindquist Syndrome   • Hypertension Mother    • Hypothyroidism Mother    • Melanoma Mother    • Squamous cell carcinoma Mother    • Leukemia Father         CLL   • Skin cancer Father    • Bell's palsy Father    • Hypertension Father    • Hyperlipidemia Father    • Heart failure Father    • Heart disease Father         CABG x 4   • Skin cancer Sister    • Colon cancer Brother         Lindquist Syndrome   • No Known Problems Daughter    • No Known Problems Son    • No Known Problems Daughter        Surgical History:   Past Surgical History:   Procedure Laterality Date   • PATELLA FRACTURE SURGERY Right 1986         Current Outpatient Medications:   •  cholecalciferol (VITAMIN D3) 25 MCG (1000 UT) tablet, Take 1 tablet by mouth Daily., Disp:  , Rfl:   •  psyllium (METAMUCIL) 58.6 % packet, Take 1 packet by mouth Daily., Disp: , Rfl:     Vitals:    01/12/22 0859   BP: 130/80   Pulse: 72   Resp: 12   Temp: 97.1 °F (36.2 °C)   SpO2: 99%     Body mass index is 29.7 kg/m².    Physical Exam  Vitals reviewed.   Constitutional:       General: He is not in acute distress.     Appearance: Normal appearance. He is well-developed. He is not ill-appearing or toxic-appearing.   HENT:      Head: Normocephalic and atraumatic.      Right Ear: Hearing, tympanic membrane, ear canal and external ear normal. There is no impacted cerumen.      Left Ear: Hearing, tympanic membrane, ear canal and external ear normal. There is no impacted cerumen.      Nose: Nose normal.      Mouth/Throat:      Mouth: Mucous membranes are moist. No oral lesions.      Tongue: No lesions.      Pharynx: Oropharynx is clear. Uvula midline. No pharyngeal swelling, oropharyngeal exudate, posterior oropharyngeal erythema or uvula swelling.      Comments: Class II-III Mallampati    Eyes:      General: Lids are normal. No scleral icterus.        Right eye: No discharge.         Left eye: No discharge.      Extraocular  Movements: Extraocular movements intact.      Conjunctiva/sclera: Conjunctivae normal.      Pupils: Pupils are equal, round, and reactive to light.   Neck:      Thyroid: No thyroid mass or thyromegaly.      Vascular: No carotid bruit.   Cardiovascular:      Rate and Rhythm: Normal rate and regular rhythm.      Pulses:           Radial pulses are 2+ on the right side and 2+ on the left side.        Dorsalis pedis pulses are 2+ on the right side and 2+ on the left side.        Posterior tibial pulses are 2+ on the right side and 2+ on the left side.      Heart sounds: Normal heart sounds, S1 normal and S2 normal. No murmur heard.  No friction rub. No gallop.    Pulmonary:      Effort: Pulmonary effort is normal. No respiratory distress.      Breath sounds: Normal breath sounds. No wheezing, rhonchi or rales.   Chest:   Breasts:      Right: No axillary adenopathy or supraclavicular adenopathy.      Left: No axillary adenopathy or supraclavicular adenopathy.       Abdominal:      General: Bowel sounds are normal. There is no distension.      Palpations: Abdomen is soft. There is no mass.      Tenderness: There is no abdominal tenderness. There is no guarding or rebound.   Genitourinary:     Prostate: Normal. Not enlarged and not tender.      Rectum: External hemorrhoid present. Normal anal tone.   Musculoskeletal:         General: No deformity. Normal range of motion.      Cervical back: Full passive range of motion without pain, normal range of motion and neck supple.      Right lower leg: No edema.      Left lower leg: No edema.   Lymphadenopathy:      Cervical: No cervical adenopathy.      Right cervical: No superficial, deep or posterior cervical adenopathy.     Left cervical: No superficial, deep or posterior cervical adenopathy.      Upper Body:      Right upper body: No supraclavicular, axillary or pectoral adenopathy.      Left upper body: No supraclavicular, axillary or pectoral adenopathy.   Skin:     General:  Skin is warm and dry.      Findings: No rash.   Neurological:      Mental Status: He is alert and oriented to person, place, and time.      Cranial Nerves: No cranial nerve deficit.      Sensory: No sensory deficit.      Motor: No weakness, tremor, atrophy or abnormal muscle tone.      Gait: Gait normal.      Deep Tendon Reflexes: Reflexes are normal and symmetric.      Reflex Scores:       Patellar reflexes are 2+ on the right side and 2+ on the left side.       Achilles reflexes are 2+ on the right side and 2+ on the left side.  Psychiatric:         Attention and Perception: Attention normal.         Mood and Affect: Mood normal.         Speech: Speech normal.         Behavior: Behavior normal. Behavior is cooperative.         Thought Content: Thought content normal.         Assessment/ Plan  Diagnoses and all orders for this visit:    Healthcare maintenance    Family hx of colon cancer    Lindquist syndrome    Vitamin D deficiency    Environmental allergies    External hemorrhoids    IFG (impaired fasting glucose)  -     Ambulatory Referral to Sleep Medicine    Fatty liver  -     Ambulatory Referral to Sleep Medicine    Suspected sleep apnea  -     Ambulatory Referral to Sleep Medicine    Snoring  -     Ambulatory Referral to Sleep Medicine    Overweight  -     Ambulatory Referral to Sleep Medicine    Other orders  -     psyllium (METAMUCIL) 58.6 % packet; Take 1 packet by mouth Daily.        Return in about 1 year (around 1/12/2023) for Annual physical.      Discussion:  Omar Ha is a 51 y.o. male RTC in yearly CPE, review of medical issues:   1. L great toe MTP pain, acute/ new issue last visit - hx and exam c/w gout last visit. Resolved with Colchicine. No recurrence. Uric acid mild elevation but pt has made TLC mods and no recurrence of sx. Prefers to avoid daily meds if able. Will monitor with watchful waiting. C/W TLC.   2. 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 Villasenor in genetics and has  dx of Lindquist Syndrome - C-scope with EGD 7/2020 per Dr. Yen, intestinal metaplasia without dysplasia noted in stomach, no polyps.  Repeat EGD/ C-scope in 7/2022 and pt aware of scheduling.   3. Vitamin D deficiency - replete on 1000 I.U. Vitamin D3 OTC daily.   4. Environmental Alleriges - Claritin PRN. Ocular sx main issue.  5. Fatty liver/ ALT elevation - US showed fatty liver in recent past. ALT responded to TLC mods in past, back up a bit today with mild weight gain.  Restart TLC mods as he is and will assess for MARGOT as noted below.   6. IFG - normalized FBS today, A1C remains normal.  Weight loss advised after recent weight gain and focal truncal weight gain.   7. Hx of Nephrolithiasis - occurred 10 years ago, passed. No recurrence. U/A clear today.  8. External hemorrhoids - RACIEL. C/W fiber daily.  9. Snoring - concern for MARGOT with nonrestorative sleep, weight gain, and early metabolic changes of fatty liver/ IFG. Refer for MARGOT eval today, reviewed with pt.   10. HM - labs d/w pt; Flu/ Tdap/ Hep A/ COVID - UTD;  Shingrix -at pharmacy;  C-scope/ EGD 7/2020 (-) --> 2 years; BARON/ PSA OK today; Hep C Ab (-) 12/2020; add back exercise for weight loss.     RTC one year CPE, F labs prior

## 2022-02-18 ENCOUNTER — APPOINTMENT (OUTPATIENT)
Dept: CT IMAGING | Facility: HOSPITAL | Age: 52
End: 2022-02-18

## 2022-02-18 ENCOUNTER — HOSPITAL ENCOUNTER (EMERGENCY)
Facility: HOSPITAL | Age: 52
Discharge: HOME | End: 2022-02-19
Attending: EMERGENCY MEDICINE | Admitting: EMERGENCY MEDICINE

## 2022-02-18 DIAGNOSIS — N20.0 KIDNEY STONE ON LEFT SIDE: ICD-10-CM

## 2022-02-18 DIAGNOSIS — R10.9 LEFT FLANK PAIN: Primary | ICD-10-CM

## 2022-02-18 DIAGNOSIS — N23 RENAL COLIC ON LEFT SIDE: ICD-10-CM

## 2022-02-18 LAB
ALBUMIN SERPL-MCNC: 4.4 G/DL (ref 3.5–5.2)
ALBUMIN/GLOB SERPL: 1.4 G/DL
ALP SERPL-CCNC: 54 U/L (ref 39–117)
ALT SERPL W P-5'-P-CCNC: 66 U/L (ref 1–41)
ANION GAP SERPL CALCULATED.3IONS-SCNC: 12.4 MMOL/L (ref 5–15)
AST SERPL-CCNC: 31 U/L (ref 1–40)
BACTERIA UR QL AUTO: ABNORMAL /HPF
BASOPHILS # BLD AUTO: 0.04 10*3/MM3 (ref 0–0.2)
BASOPHILS NFR BLD AUTO: 0.4 % (ref 0–1.5)
BILIRUB SERPL-MCNC: 0.6 MG/DL (ref 0–1.2)
BILIRUB UR QL STRIP: NEGATIVE
BUN SERPL-MCNC: 16 MG/DL (ref 6–20)
BUN/CREAT SERPL: 13.8 (ref 7–25)
CALCIUM SPEC-SCNC: 9.7 MG/DL (ref 8.6–10.5)
CHLORIDE SERPL-SCNC: 101 MMOL/L (ref 98–107)
CLARITY UR: CLEAR
CO2 SERPL-SCNC: 26.6 MMOL/L (ref 22–29)
COLOR UR: YELLOW
CREAT SERPL-MCNC: 1.16 MG/DL (ref 0.76–1.27)
DEPRECATED RDW RBC AUTO: 41.8 FL (ref 37–54)
EOSINOPHIL # BLD AUTO: 0.1 10*3/MM3 (ref 0–0.4)
EOSINOPHIL NFR BLD AUTO: 0.9 % (ref 0.3–6.2)
ERYTHROCYTE [DISTWIDTH] IN BLOOD BY AUTOMATED COUNT: 13 % (ref 12.3–15.4)
GFR SERPL CREATININE-BSD FRML MDRD: 66 ML/MIN/1.73
GLOBULIN UR ELPH-MCNC: 3.1 GM/DL
GLUCOSE SERPL-MCNC: 131 MG/DL (ref 65–99)
GLUCOSE UR STRIP-MCNC: NEGATIVE MG/DL
HCT VFR BLD AUTO: 46.3 % (ref 37.5–51)
HGB BLD-MCNC: 16.3 G/DL (ref 13–17.7)
HGB UR QL STRIP.AUTO: ABNORMAL
HYALINE CASTS UR QL AUTO: ABNORMAL /LPF
KETONES UR QL STRIP: ABNORMAL
LEUKOCYTE ESTERASE UR QL STRIP.AUTO: NEGATIVE
LIPASE SERPL-CCNC: 39 U/L (ref 13–60)
LYMPHOCYTES # BLD AUTO: 1.43 10*3/MM3 (ref 0.7–3.1)
LYMPHOCYTES NFR BLD AUTO: 13.1 % (ref 19.6–45.3)
MCH RBC QN AUTO: 31.3 PG (ref 26.6–33)
MCHC RBC AUTO-ENTMCNC: 35.2 G/DL (ref 31.5–35.7)
MCV RBC AUTO: 89 FL (ref 79–97)
MONOCYTES # BLD AUTO: 0.88 10*3/MM3 (ref 0.1–0.9)
MONOCYTES NFR BLD AUTO: 8.1 % (ref 5–12)
NEUTROPHILS NFR BLD AUTO: 77.2 % (ref 42.7–76)
NEUTROPHILS NFR BLD AUTO: 8.41 10*3/MM3 (ref 1.7–7)
NITRITE UR QL STRIP: NEGATIVE
PH UR STRIP.AUTO: 5.5 [PH] (ref 5–8)
PLATELET # BLD AUTO: 167 10*3/MM3 (ref 140–450)
PMV BLD AUTO: 10.6 FL (ref 6–12)
POTASSIUM SERPL-SCNC: 3.8 MMOL/L (ref 3.5–5.2)
PROT SERPL-MCNC: 7.5 G/DL (ref 6–8.5)
PROT UR QL STRIP: ABNORMAL
RBC # BLD AUTO: 5.2 10*6/MM3 (ref 4.14–5.8)
RBC # UR STRIP: ABNORMAL /HPF
REF LAB TEST METHOD: ABNORMAL
SODIUM SERPL-SCNC: 140 MMOL/L (ref 136–145)
SP GR UR STRIP: 1.02 (ref 1–1.03)
SQUAMOUS #/AREA URNS HPF: ABNORMAL /HPF
UROBILINOGEN UR QL STRIP: ABNORMAL
WBC # UR STRIP: ABNORMAL /HPF
WBC NRBC COR # BLD: 10.89 10*3/MM3 (ref 3.4–10.8)

## 2022-02-18 PROCEDURE — 85007 BL SMEAR W/DIFF WBC COUNT: CPT | Performed by: PHYSICIAN ASSISTANT

## 2022-02-18 PROCEDURE — 80053 COMPREHEN METABOLIC PANEL: CPT | Performed by: PHYSICIAN ASSISTANT

## 2022-02-18 PROCEDURE — 25010000002 ONDANSETRON PER 1 MG: Performed by: EMERGENCY MEDICINE

## 2022-02-18 PROCEDURE — 83690 ASSAY OF LIPASE: CPT | Performed by: PHYSICIAN ASSISTANT

## 2022-02-18 PROCEDURE — 74176 CT ABD & PELVIS W/O CONTRAST: CPT

## 2022-02-18 PROCEDURE — 85025 COMPLETE CBC W/AUTO DIFF WBC: CPT | Performed by: PHYSICIAN ASSISTANT

## 2022-02-18 PROCEDURE — 25010000002 KETOROLAC TROMETHAMINE PER 15 MG: Performed by: PHYSICIAN ASSISTANT

## 2022-02-18 PROCEDURE — 96374 THER/PROPH/DIAG INJ IV PUSH: CPT

## 2022-02-18 PROCEDURE — 96375 TX/PRO/DX INJ NEW DRUG ADDON: CPT

## 2022-02-18 PROCEDURE — 99283 EMERGENCY DEPT VISIT LOW MDM: CPT

## 2022-02-18 PROCEDURE — 81001 URINALYSIS AUTO W/SCOPE: CPT | Performed by: PHYSICIAN ASSISTANT

## 2022-02-18 PROCEDURE — 25010000002 HYDROMORPHONE PER 4 MG: Performed by: EMERGENCY MEDICINE

## 2022-02-18 PROCEDURE — 96376 TX/PRO/DX INJ SAME DRUG ADON: CPT

## 2022-02-18 PROCEDURE — 25010000002 ONDANSETRON PER 1 MG: Performed by: PHYSICIAN ASSISTANT

## 2022-02-18 RX ORDER — ONDANSETRON 4 MG/1
4 TABLET, ORALLY DISINTEGRATING ORAL EVERY 8 HOURS PRN
Qty: 12 TABLET | Refills: 0 | Status: SHIPPED | OUTPATIENT
Start: 2022-02-18 | End: 2022-02-21

## 2022-02-18 RX ORDER — ONDANSETRON 2 MG/ML
4 INJECTION INTRAMUSCULAR; INTRAVENOUS ONCE
Status: COMPLETED | OUTPATIENT
Start: 2022-02-18 | End: 2022-02-18

## 2022-02-18 RX ORDER — KETOROLAC TROMETHAMINE 15 MG/ML
15 INJECTION, SOLUTION INTRAMUSCULAR; INTRAVENOUS ONCE
Status: COMPLETED | OUTPATIENT
Start: 2022-02-18 | End: 2022-02-18

## 2022-02-18 RX ORDER — SODIUM CHLORIDE 0.9 % (FLUSH) 0.9 %
10 SYRINGE (ML) INJECTION AS NEEDED
Status: DISCONTINUED | OUTPATIENT
Start: 2022-02-18 | End: 2022-02-19 | Stop reason: HOSPADM

## 2022-02-18 RX ORDER — HYDROCODONE BITARTRATE AND ACETAMINOPHEN 7.5; 325 MG/1; MG/1
1 TABLET ORAL EVERY 4 HOURS PRN
Qty: 12 TABLET | Refills: 0 | Status: SHIPPED | OUTPATIENT
Start: 2022-02-18 | End: 2022-07-22

## 2022-02-18 RX ORDER — HYDROMORPHONE HYDROCHLORIDE 1 MG/ML
0.5 INJECTION, SOLUTION INTRAMUSCULAR; INTRAVENOUS; SUBCUTANEOUS ONCE
Status: COMPLETED | OUTPATIENT
Start: 2022-02-18 | End: 2022-02-18

## 2022-02-18 RX ADMIN — LIDOCAINE HYDROCHLORIDE 150 MG: 10 INJECTION, SOLUTION INFILTRATION; PERINEURAL at 22:36

## 2022-02-18 RX ADMIN — ONDANSETRON 4 MG: 2 INJECTION INTRAMUSCULAR; INTRAVENOUS at 21:25

## 2022-02-18 RX ADMIN — KETOROLAC TROMETHAMINE 15 MG: 15 INJECTION, SOLUTION INTRAMUSCULAR; INTRAVENOUS at 21:26

## 2022-02-18 RX ADMIN — ONDANSETRON 4 MG: 2 INJECTION INTRAMUSCULAR; INTRAVENOUS at 22:07

## 2022-02-18 RX ADMIN — HYDROMORPHONE HYDROCHLORIDE 0.5 MG: 1 INJECTION, SOLUTION INTRAMUSCULAR; INTRAVENOUS; SUBCUTANEOUS at 21:27

## 2022-02-18 RX ADMIN — SODIUM CHLORIDE 1000 ML: 9 INJECTION, SOLUTION INTRAVENOUS at 21:28

## 2022-02-19 VITALS
BODY MASS INDEX: 28.7 KG/M2 | TEMPERATURE: 97.6 F | HEART RATE: 103 BPM | OXYGEN SATURATION: 91 % | SYSTOLIC BLOOD PRESSURE: 139 MMHG | DIASTOLIC BLOOD PRESSURE: 88 MMHG | WEIGHT: 205 LBS | HEIGHT: 71 IN | RESPIRATION RATE: 24 BRPM

## 2022-02-19 RX ORDER — TAMSULOSIN HYDROCHLORIDE 0.4 MG/1
1 CAPSULE ORAL DAILY
Qty: 10 CAPSULE | Refills: 0 | Status: SHIPPED | OUTPATIENT
Start: 2022-02-19 | End: 2022-08-01

## 2022-02-19 RX ORDER — HYDROCODONE BITARTRATE AND ACETAMINOPHEN 7.5; 325 MG/1; MG/1
1 TABLET ORAL ONCE
Status: COMPLETED | OUTPATIENT
Start: 2022-02-19 | End: 2022-02-19

## 2022-02-19 RX ADMIN — HYDROCODONE BITARTRATE AND ACETAMINOPHEN 1 TABLET: 7.5; 325 TABLET ORAL at 00:15

## 2022-02-19 NOTE — ED PROVIDER NOTES
Pt presents to the ED c/o acute onset left flank pain earlier tonight with nausea and vomiting.  History of kidney stone in the past.  Still quite uncomfortable despite several medications for pain prior to my evaluation.  CT scan pending.     On exam,   General: Appears uncomfortable, nontoxic, nondiaphoretic  HEENT: Mucous membranes moist, atraumatic, EOMI  Neck: Full ROM  Pulm: Symmetric chest rise, nonlabored, lungs CTAB  Cardiovascular: Regular rate and rhythm, intact distal pulses  GI: Soft, nontender, nondistended, no rebound, no guarding, bowel sounds present  MSK: Full ROM, no deformity  Skin: Warm, dry  Neuro: Awake, alert, oriented x 4, GCS 15, moving all extremities, no focal deficits  Psych: Calm, cooperative      N95, protective eye goggles, and gloves used during this encounter. Patient in surgical mask.      Plan:   ED Course as of 02/18/22 2315   Fri Feb 18, 2022 2205 WBC(!): 10.89 [LAKSHMI]   2205 Hemoglobin: 16.3 [LAKSHIM]   2205 Hematocrit: 46.3 [LAKSHMI]   2206 Platelets: 167 [LAKSHMI]   2206 Sodium: 140 [LAKSHMI]   2206 Potassium: 3.8 [LAKSHMI]   2206 Chloride: 101 [LAKSHMI]   2206 CO2: 26.6 [LAKSHMI]   2206 BUN: 16 [LAKSHMI]   2206 Creatinine: 1.16 [LAKSHMI]   2206 Glucose(!): 131 [LAKSHMI]   2206 Blood, UA(!): Large (3+) [LAKSHMI]   2206 Protein, UA(!): Trace [LAKSHMI]   2206 Leukocytes, UA: Negative [LAKSHMI]   2206 Nitrite, UA: Negative [LAKSHMI]   2206 RBC, UA(!): 21-30 [LAKSHMI]   2206 WBC, UA(!): 3-5 [LAKSHMI]   2206 Bacteria, UA: None Seen [LAKSHMI]   2207 CT images viewed by me, patient appears to have a 7 mm stone in the proximal left ureter. [LAKSHMI]   2215 Patient rechecked, discussed results including images and showed him the left-sided ureteral stone.  Patient still in a moderate amount of pain. [LAKSHMI]      ED Course User Index  [LAKSHMI] Andres Fisher PA     Patient with improved pain, kidney stone as noted on CT scan, no evidence of renal failure or infectious process.  Safe for discharge with outpatient pain meds and outpatient urology follow-up.  ED return for  worsening symptoms as needed.     Attestation:  The [facility] and I have discussed this patient's history, physical exam, and treatment plan.  I have reviewed the documentation and personally had a face to face interaction with the patient. I affirm the documentation and agree with the treatment and plan.  The attached note describes my personal findings.          César Crandall MD  02/18/22 [id number]

## 2022-02-19 NOTE — ED TRIAGE NOTES
Pt to ED via [facility]. Pt nauseous at triage. Pt reports lower L flank pain x 2 hours. Pt reports he believes it is kidney stones. Kidney stone hx. Pt has vomited multiple times. Pt unable to stand at triage, unable to get pulse ox reading.

## 2022-02-19 NOTE — ED PROVIDER NOTES
EMERGENCY DEPARTMENT ENCOUNTER    Room Number:  10/10  Date of encounter:  2/19/2022  PCP: Andres Grayson MD  Historian: Patient      HPI:  Chief Complaint: LEFT flank pain      Context: Omar Ha is a 51 y.o. male with past medical history of prior kidney stone who presents to the ED c/o left flank pain.  Patient states that he had gradual onset of left flank pain a couple of hours ago that was associated with multiple episodes of nausea and vomiting.  Patient states the pain felt like a hard cramp and radiated into his groin.  Patient states that he feels like he just cannot get comfortable, and the pain feels very similar to his previous episode.  Denies any hematuria, dysuria, fever, chest pain, shortness of breath, or headache.      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   • Vitamin D deficiency 12/11/2018   • Environmental allergies 12/16/2019   • External hemorrhoids 12/16/2019   • IFG (impaired fasting glucose) 12/18/2020   • DDD (degenerative disc disease), cervical 12/18/2020   • Fatty liver 12/31/2020     Resolved Ambulatory Problems     Diagnosis Date Noted   • Chronic bilateral low back pain without sciatica 12/04/2017     Past Medical History:   Diagnosis Date   • Nephrolithiasis 2007         PAST SURGICAL HISTORY  Past Surgical History:   Procedure Laterality Date   • PATELLA FRACTURE SURGERY Right 1986         FAMILY HISTORY  Family History   Problem Relation Age of Onset   • Mitral valve prolapse Mother    • Uterine cancer Mother    • Colon cancer Mother         Lindquist Syndrome   • Hypertension Mother    • Hypothyroidism Mother    • Melanoma Mother    • Squamous cell carcinoma Mother    • Leukemia Father         CLL   • Skin cancer Father    • Bell's palsy Father    • Hypertension Father    • Hyperlipidemia Father    • Heart failure Father    • Heart disease Father         CABG x 4   • Skin  cancer Sister    • Colon cancer Brother         Lindquist Syndrome   • No Known Problems Daughter    • No Known Problems Son    • No Known Problems Daughter          SOCIAL HISTORY  Social History     Socioeconomic History   • Marital status:    • Number of children: 3   Tobacco Use   • Smoking status: Never Smoker   • Smokeless tobacco: Never Used   Vaping Use   • Vaping Use: Never used   Substance and Sexual Activity   • Alcohol use: Yes     Comment: 2-4 drinks/ month   • Drug use: No   • Sexual activity: Yes     Partners: Female     Comment: wife only; no hx STD's         ALLERGIES  Patient has no known allergies.        REVIEW OF SYSTEMS  Review of Systems     All systems reviewed and negative except for those discussed in HPI.       PHYSICAL EXAM    I have reviewed the triage vital signs and nursing notes.    ED Triage Vitals [02/18/22 2059]   Temp Pulse Resp BP SpO2   97.6 °F (36.4 °C) -- -- -- --      Temp src Heart Rate Source Patient Position BP Location FiO2 (%)   Tympanic -- -- -- --       Physical Exam  GENERAL: Alert and oriented x3, patient in moderate pain distress   HENT: moist mucous membranes, mask in place  EYES: no scleral icterus, PERRL, EOMI  CV: regular rhythm, regular rate  RESPIRATORY: normal effort  ABDOMEN: soft/nontender, no rebound or guarding, no CVA tenderness  MUSCULOSKELETAL: no deformity  NEURO: alert, moves all extremities, follows commands  SKIN: warm, dry, no rash        LAB RESULTS  Recent Results (from the past 24 hour(s))   Comprehensive Metabolic Panel    Collection Time: 02/18/22  9:12 PM    Specimen: Arm, Left; Blood   Result Value Ref Range    Glucose 131 (H) 65 - 99 mg/dL    BUN 16 6 - 20 mg/dL    Creatinine 1.16 0.76 - 1.27 mg/dL    Sodium 140 136 - 145 mmol/L    Potassium 3.8 3.5 - 5.2 mmol/L    Chloride 101 98 - 107 mmol/L    CO2 26.6 22.0 - 29.0 mmol/L    Calcium 9.7 8.6 - 10.5 mg/dL    Total Protein 7.5 6.0 - 8.5 g/dL    Albumin 4.40 3.50 - 5.20 g/dL    ALT (SGPT)  66 (H) 1 - 41 U/L    AST (SGOT) 31 1 - 40 U/L    Alkaline Phosphatase 54 39 - 117 U/L    Total Bilirubin 0.6 0.0 - 1.2 mg/dL    eGFR Non African Amer 66 >60 mL/min/1.73    Globulin 3.1 gm/dL    A/G Ratio 1.4 g/dL    BUN/Creatinine Ratio 13.8 7.0 - 25.0    Anion Gap 12.4 5.0 - 15.0 mmol/L   Lipase    Collection Time: 02/18/22  9:12 PM    Specimen: Arm, Left; Blood   Result Value Ref Range    Lipase 39 13 - 60 U/L   CBC Auto Differential    Collection Time: 02/18/22  9:12 PM    Specimen: Arm, Left; Blood   Result Value Ref Range    WBC 10.89 (H) 3.40 - 10.80 10*3/mm3    RBC 5.20 4.14 - 5.80 10*6/mm3    Hemoglobin 16.3 13.0 - 17.7 g/dL    Hematocrit 46.3 37.5 - 51.0 %    MCV 89.0 79.0 - 97.0 fL    MCH 31.3 26.6 - 33.0 pg    MCHC 35.2 31.5 - 35.7 g/dL    RDW 13.0 12.3 - 15.4 %    RDW-SD 41.8 37.0 - 54.0 fl    MPV 10.6 6.0 - 12.0 fL    Platelets 167 140 - 450 10*3/mm3    Neutrophil % 77.2 (H) 42.7 - 76.0 %    Lymphocyte % 13.1 (L) 19.6 - 45.3 %    Monocyte % 8.1 5.0 - 12.0 %    Eosinophil % 0.9 0.3 - 6.2 %    Basophil % 0.4 0.0 - 1.5 %    Neutrophils, Absolute 8.41 (H) 1.70 - 7.00 10*3/mm3    Lymphocytes, Absolute 1.43 0.70 - 3.10 10*3/mm3    Monocytes, Absolute 0.88 0.10 - 0.90 10*3/mm3    Eosinophils, Absolute 0.10 0.00 - 0.40 10*3/mm3    Basophils, Absolute 0.04 0.00 - 0.20 10*3/mm3   Urinalysis With Microscopic If Indicated (No Culture) - Urine, Clean Catch    Collection Time: 02/18/22  9:32 PM    Specimen: Urine, Clean Catch   Result Value Ref Range    Color, UA Yellow Yellow, Straw    Appearance, UA Clear Clear    pH, UA 5.5 5.0 - 8.0    Specific Gravity, UA 1.020 1.005 - 1.030    Glucose, UA Negative Negative    Ketones, UA Trace (A) Negative    Bilirubin, UA Negative Negative    Blood, UA Large (3+) (A) Negative    Protein, UA Trace (A) Negative    Leuk Esterase, UA Negative Negative    Nitrite, UA Negative Negative    Urobilinogen, UA 0.2 E.U./dL 0.2 - 1.0 E.U./dL   Urinalysis, Microscopic Only - Urine, Clean  Catch    Collection Time: 02/18/22  9:32 PM    Specimen: Urine, Clean Catch   Result Value Ref Range    RBC, UA 21-30 (A) None Seen, 0-2 /HPF    WBC, UA 3-5 (A) None Seen, 0-2 /HPF    Bacteria, UA None Seen None Seen /HPF    Squamous Epithelial Cells, UA 0-2 None Seen, 0-2 /HPF    Hyaline Casts, UA 7-12 None Seen /LPF    Methodology Automated Microscopy        Ordered the above labs and independently reviewed the results.        RADIOLOGY  CT Abdomen Pelvis Without Contrast    Result Date: 2/18/2022  CT OF THE ABDOMEN AND PELVIS WITHOUT CONTRAST  HISTORY: Left flank pain  COMPARISON: None available.  TECHNIQUE: Axial CT imaging was obtained through the abdomen and pelvis. No IV contrast was administered.  FINDINGS: Images through the lung bases demonstrate dependent atelectasis. The stomach, duodenum, adrenal glands, spleen, pancreas, and gallbladder are all normal. No suspicious hepatic lesions are seen. There is mild left-sided hydroureteronephrosis. This is secondary to a 5 mm stone within the proximal left ureter. There is a 2 mm nonobstructing stone within the right kidney. There are 2 stones identified within the left kidney, with the larger measuring up to 4 mm. No distal ureteral or bladder stones are seen. Prostate gland is within normal limits. There is no bowel obstruction. The appendix is normal. No acute osseous abnormalities are seen.      Mild left-sided hydroureteronephrosis, secondary to a 5 mm stone within the proximal left ureter.  Radiation dose reduction techniques were utilized, including automated exposure control and exposure modulation based on body size.  This report was finalized on 2/18/2022 10:22 PM by Dr. Iris Owusu M.D.        I ordered the above noted radiological studies. Reviewed by me and discussed with radiologist.  See dictation for official radiology interpretation.      PROCEDURES    Procedures      MEDICATIONS GIVEN IN ER    Medications   sodium chloride 0.9 % flush 10  mL (has no administration in time range)   sodium chloride 0.9 % bolus 1,000 mL (0 mL Intravenous Stopped 2/18/22 2312)   ondansetron (ZOFRAN) injection 4 mg (4 mg Intravenous Given 2/18/22 2125)   ketorolac (TORADOL) injection 15 mg (15 mg Intravenous Given 2/18/22 2126)   HYDROmorphone (DILAUDID) injection 0.5 mg (0.5 mg Intravenous Given 2/18/22 2127)   lidocaine (XYLOCAINE) 1 % 150 mg in sodium chloride 0.9 % 250 mL IVPB (150 mg Intravenous Given 2/18/22 2236)   ondansetron (ZOFRAN) injection 4 mg (4 mg Intravenous Given 2/18/22 2207)   HYDROcodone-acetaminophen (NORCO) 7.5-325 MG per tablet 1 tablet (1 tablet Oral Given 2/19/22 0015)         PROGRESS, DATA ANALYSIS, CONSULTS, AND MEDICAL DECISION MAKING    All labs have been independently reviewed by me.  All radiology studies have been reviewed by me and discussed with radiologist dictating the report.   EKG's independently viewed and interpreted by me.  Discussion below represents my analysis of pertinent findings related to patient's condition, differential diagnosis, treatment plan and final disposition.    DDx: Includes but not limited to kidney stone, ureterolithiasis, hydronephrosis, UTI, pyelonephritis, diverticulitis, pancreatitis, aortic dissection    ED Course as of 02/19/22 0304   Fri Feb 18, 2022 2205 WBC(!): 10.89 [LAKSHMI]   2205 Hemoglobin: 16.3 [LAKSHMI]   2205 Hematocrit: 46.3 [LAKSHMI]   2206 Platelets: 167 [LAKSHMI]   2206 Sodium: 140 [LAKSHMI]   2206 Potassium: 3.8 [LAKSHMI]   2206 Chloride: 101 [LAKSHMI]   2206 CO2: 26.6 [LAKSHMI]   2206 BUN: 16 [LAKSHMI]   2206 Creatinine: 1.16 [LAKSHMI]   2206 Glucose(!): 131 [LAKSHMI]   2206 Blood, UA(!): Large (3+) [LAKSHMI]   2206 Protein, UA(!): Trace [LAKSHMI]   2206 Leukocytes, UA: Negative [LAKSHMI]   2206 Nitrite, UA: Negative [LAKSHMI]   2206 RBC, UA(!): 21-30 [LAKSHMI]   2206 WBC, UA(!): 3-5 [LAKSHMI]   2206 Bacteria, UA: None Seen [LAKSHMI]   2207 CT images viewed by me, patient appears to have a 7 mm stone in the proximal left ureter. [LAKSHMI]   221 Patient rechecked, discussed  results including images and showed him the left-sided ureteral stone.  Patient still in a moderate amount of pain. [LAKSHMI]   2221 Patient rechecked, resting in bed, pain is resolved.  Discussed results, treatment plan, and strict return to ER precautions.  They expressed understanding and agree with plan. [LAKSHMI]   Sat Feb 19, 2022   0055 Patient rechecked, states the pain is returning.  Patient just received a p.o. Norco approximately 15 minutes ago.  Patient was offered placement into the observation unit for pain control, he is very concerned about the fact that there are no 24-hour pharmacies and would not be able to get any more pain medicine until at least 9:00 in the morning.  At this time he has declined a placement into the OBS unit and would like to wait approximately 30 minutes to see what happens with his pain. [LAKSHMI]   0145 Rechecked, resting comfortably bed, pain is improved.  Patient comfortable with discharge home at this point. [LAKSHMI]      ED Course User Index  [LAKSHMI] Andres Fisher PA       MDM: Patient's evaluation reveals a 5 mm proximal ureteral stone on the left, blood work including WBC and kidney function are normal, urine is not infected.  The patient's pain is currently under control and he is stable for discharge.  He has been given strict return to ER precautions and appropriate urology follow-up.    PPE: The patient wore a surgical mask throughout the entire patient encounter. I wore an N95.    AS OF 03:04 EST VITALS:    BP - 139/88  HR - 103  TEMP - 97.6 °F (36.4 °C) (Tympanic)  O2 SATS - 91%        DIAGNOSIS  Final diagnoses:   Left flank pain   Kidney stone on left side   Renal colic on left side         DISPOSITION  DISCHARGE    Patient discharged in stable condition.    Reviewed implications of results, diagnosis, meds, responsibility to follow up, warning signs and symptoms of possible worsening, potential complications and reasons to return to ER.    Patient/Family voiced understanding of  above instructions.    Discussed plan for discharge, as there is no emergent indication for admission. Patient referred to primary care provider for BP management due to today's BP. Pt/family is agreeable and understands need for follow up and repeat testing.  Pt is aware that discharge does not mean that nothing is wrong but it indicates no emergency is present that requires admission and they must continue care with follow-up as given below or physician of their choice.     FOLLOW-UP  Guille Joshi MD  [address] [id number] [phone]    Schedule an appointment as soon as possible for a visit            Medication List      New Prescriptions    HYDROcodone-acetaminophen 7.5-325 MG per tablet  Commonly known as: NORCO  Take 1 tablet by mouth Every 4 (Four) Hours As Needed for Severe Pain .     ondansetron ODT 4 MG disintegrating tablet  Commonly known as: ZOFRAN-ODT  Place 1 tablet on the tongue Every 8 (Eight) Hours As Needed for Nausea or Vomiting for up to 3 days.     tamsulosin 0.4 MG capsule 24 hr capsule  Commonly known as: FLOMAX  Take 1 capsule by mouth Daily.           Where to Get Your Medications      These medications were sent to [id number] - [place], [place] [facility] [address] [facility] [address] [place] [address] [facility] - [phone]  - [phone] FX  [facility] [address]    Phone: [phone]   · HYDROcodone-acetaminophen 7.5-325 MG per tablet  · ondansetron ODT 4 MG disintegrating tablet  · tamsulosin 0.4 MG capsule 24 hr capsule                    Andres Fisher PA  02/19/22 0008       Andres Fisher PA  02/19/22 3678

## 2022-02-19 NOTE — DISCHARGE INSTRUCTIONS
Home, rest, medicine as prescribed, keep well-hydrated, strain your urine.  Follow up with urology for recheck and further evaluation. Return to care if develop fever, unable to urinate, unable to control the pain, or with further concerns.

## 2022-02-23 ENCOUNTER — OFFICE VISIT (OUTPATIENT)
Dept: SLEEP MEDICINE | Facility: HOSPITAL | Age: 52
End: 2022-02-23

## 2022-02-23 VITALS
BODY MASS INDEX: 29.26 KG/M2 | SYSTOLIC BLOOD PRESSURE: 145 MMHG | OXYGEN SATURATION: 97 % | WEIGHT: 209 LBS | DIASTOLIC BLOOD PRESSURE: 93 MMHG | HEIGHT: 71 IN | HEART RATE: 63 BPM

## 2022-02-23 DIAGNOSIS — G47.8 NON-RESTORATIVE SLEEP: ICD-10-CM

## 2022-02-23 DIAGNOSIS — G47.63 SLEEP-RELATED BRUXISM: ICD-10-CM

## 2022-02-23 DIAGNOSIS — R06.83 SNORING: ICD-10-CM

## 2022-02-23 DIAGNOSIS — E66.3 OVERWEIGHT WITH BODY MASS INDEX (BMI) 25.0-29.9: ICD-10-CM

## 2022-02-23 DIAGNOSIS — G47.10 HYPERSOMNIA: Primary | ICD-10-CM

## 2022-02-23 PROCEDURE — 99204 OFFICE O/P NEW MOD 45 MIN: CPT | Performed by: FAMILY MEDICINE

## 2022-02-23 PROCEDURE — G0463 HOSPITAL OUTPT CLINIC VISIT: HCPCS

## 2022-02-23 NOTE — PROGRESS NOTES
"[facility]Consultation       Reason for Consultation: Snoring suspected sleep apnea      Patient Care Team:  Andres Grayson MD as PCP - General (Internal Medicine)  Virgie Lin MD as Consulting Physician (Sleep Medicine)      History of present illness:  Thank you for asking me to see your patient.  The patient is a 51 y.o. male with environmental allergies vitamin D deficiency fatty liver Lindquist syndrome DDD presents today with concern for sleep disorder.  No history of prior sleep study or tonsillectomy.  Patient reports snoring hypersomnia nonrestorative sleep sleep-related bruxism occasionally difficulty staying asleep at night nocturia 0-1 times a night.  No history of sleep apnea in his family that he is aware of.  Overweight with BMI 29.2.    Bedtime 11 PM to 12 AM; sleep latency immediate; wake time 7:30 AM; average hours slept 7-1/2-8-1/2.  0 naps per day no rotating shifts.    ESS: 3    Social History:  no tobacco use 0-1 alcoholic drinks per week 0-1 caffeinated beverages a day no drug use    Allergies:  Patient has no known allergies.    Family History: MARGOT no       Current Outpatient Medications:   •  cholecalciferol (VITAMIN D3) 25 MCG (1000 UT) tablet, Take 1 tablet by mouth Daily., Disp:  , Rfl:   •  HYDROcodone-acetaminophen (NORCO) 7.5-325 MG per tablet, Take 1 tablet by mouth Every 4 (Four) Hours As Needed for Severe Pain ., Disp: 12 tablet, Rfl: 0  •  psyllium (METAMUCIL) 58.6 % packet, Take 1 packet by mouth Daily., Disp: , Rfl:   •  tamsulosin (FLOMAX) 0.4 MG capsule 24 hr capsule, Take 1 capsule by mouth Daily., Disp: 10 capsule, Rfl: 0    Vital Signs:    Vitals:    02/23/22 1057   BP: 145/93   Pulse: 63   SpO2: 97%   Weight: 94.8 kg (209 lb)   Height: 180.3 cm (71\")      Body mass index is 29.15 kg/m².  Neck Circumference: 16.5 inches      REVIEW OF SYSTEMS.  Full review of systems available on the intake form which is scanned in the media tab.  The " "relevant positive are noted below  1. Daytime excessive sleepiness with Adrian Sleepiness Scale :Total score: 3   2. Snoring  3. All negative      Physical exam:  Vitals:    02/23/22 1057   BP: 145/93   Pulse: 63   SpO2: 97%   Weight: 94.8 kg (209 lb)   Height: 180.3 cm (71\")    Body mass index is 29.15 kg/m². Neck Circumference: 16.5 inches  HEENT: Head is atraumatic, normocephalic  Eyes: pupils are round equal and reacting to light and accommodation, conjunctiva normal  Nose: no nasal septal defects or deviation and the nasal passages are clear, no nasal polyps,  Throat: oral airway Mallampati class 2-3  NECK:Neck Circumference: 16.5 inches, trachea is in the midline, thyroid not enlarged  RESPIRATORY SYSTEM: Breath sounds are equal on both sides, there are no wheezes   CARDIOVASULAR SYSTEM: Heart sounds are regular rhythm and travis rate, no edema  EXTREMITES: No cyanosis, clubbing  NEUROLOGICAL SYSTEM: Oriented x 3, no gross motor defects, gait normal      Impression:  1. Hypersomnia    2. Non-restorative sleep    3. Overweight with body mass index (BMI) 25.0-29.9    4. Snoring    5. Sleep-related bruxism        Plan:    Good sleep hygiene measures should be maintained.  Weight loss would be beneficial in this patient who is overweight BMI 29.2.    I discussed the pathophysiology of obstructive sleep apnea with the patient.  We discussed the adverse outcomes associated with untreated sleep-disordered breathing.  We discussed treatment modalities of obstructive sleep apnea including CPAP device as well as oral mandibular advancement device. Sleep study will be scheduled to establish definitive diagnosis of sleep disorder breathing.  Weight loss will be strongly beneficial in order to reduce the severity of sleep-disordered breathing.  Patient has narrow oropharyngeal structure.  Caution during activities that require prolonged concentration is strongly advised.  Patient will be notified of sleep study results " after sleep study is completed.  If sleep apnea is only mild,  oral mandibular advancement device may be one of the treatment options.  However if sleep apnea is moderately severe, CPAP treatment will be strongly encouraged.  The patient is not opposed to treatment with CPAP device if we confirm significant obstructive sleep apnea on polysomnography.     Thank you for allowing me to participate in your patient's care.    Virgie Lin MD  Sleep Medicine  02/23/22  11:31 EST

## 2022-03-21 ENCOUNTER — APPOINTMENT (OUTPATIENT)
Dept: SLEEP MEDICINE | Facility: HOSPITAL | Age: 52
End: 2022-03-21

## 2022-04-14 ENCOUNTER — HOSPITAL ENCOUNTER (OUTPATIENT)
Dept: SLEEP MEDICINE | Facility: HOSPITAL | Age: 52
Discharge: HOME | End: 2022-04-14
Admitting: FAMILY MEDICINE

## 2022-04-14 DIAGNOSIS — E66.3 OVERWEIGHT WITH BODY MASS INDEX (BMI) 25.0-29.9: ICD-10-CM

## 2022-04-14 DIAGNOSIS — R06.83 SNORING: ICD-10-CM

## 2022-04-14 DIAGNOSIS — G47.8 NON-RESTORATIVE SLEEP: ICD-10-CM

## 2022-04-14 DIAGNOSIS — G47.10 HYPERSOMNIA: ICD-10-CM

## 2022-04-14 DIAGNOSIS — G47.63 SLEEP-RELATED BRUXISM: ICD-10-CM

## 2022-04-14 PROCEDURE — 95806 SLEEP STUDY UNATT&RESP EFFT: CPT | Performed by: FAMILY MEDICINE

## 2022-04-14 PROCEDURE — 95806 SLEEP STUDY UNATT&RESP EFFT: CPT

## 2022-04-20 DIAGNOSIS — G47.33 OBSTRUCTIVE SLEEP APNEA: Primary | ICD-10-CM

## 2022-04-20 DIAGNOSIS — E66.3 OVERWEIGHT WITH BODY MASS INDEX (BMI) 25.0-29.9: ICD-10-CM

## 2022-04-20 DIAGNOSIS — G47.63 SLEEP-RELATED BRUXISM: ICD-10-CM

## 2022-04-20 DIAGNOSIS — R06.83 SNORING: ICD-10-CM

## 2022-04-20 DIAGNOSIS — G47.10 HYPERSOMNIA: ICD-10-CM

## 2022-04-20 DIAGNOSIS — G47.34 SLEEP RELATED HYPOXIA: ICD-10-CM

## 2022-04-20 DIAGNOSIS — G47.8 NON-RESTORATIVE SLEEP: ICD-10-CM

## 2022-04-21 ENCOUNTER — TELEPHONE (OUTPATIENT)
Dept: SLEEP MEDICINE | Facility: HOSPITAL | Age: 52
End: 2022-04-21

## 2022-04-21 NOTE — TELEPHONE ENCOUNTER
Spoke with patient about sleep study results, sending orders to [facility] per patient request , will schedule follow up once set up on CPAP

## 2022-07-12 ENCOUNTER — TELEPHONE (OUTPATIENT)
Dept: INTERNAL MEDICINE | Facility: CLINIC | Age: 52
End: 2022-07-12

## 2022-07-12 NOTE — TELEPHONE ENCOUNTER
Caller: Omar Ha    Relationship to patient: Self    Best call back number: [phone]    Patient is needing: PATIENT'S  BLOOD PRESSURE INCREASES EACH TIME HE GOES TO UROLOGIST.   THESE ARE TOP NUMBERS THAT HE CAN REMEMBER   APPROX 4 MONTHS AGO WAS  130,    APPROX 2 MONTHS AGO    TODAY, 7-12-22 WAS  168    PATIENT IS SEEING UROLOGIST EVERY 4-6 WEEKS DUE TO A KIDNEY STONE THAT WON'T PASS. PATIENT ASKING IF HE SHOULD BE CONCERNED ABOUT BLOOD PRESSUE, SHOULD HE BE SEEN BY DR OBANDO?     PLEASE ADVISE

## 2022-07-13 NOTE — TELEPHONE ENCOUNTER
I CALLED AND SPOKE WITH PATIENT.     HE SAID HE IS SCHEDULED TO SEE YOU ON 8/11/22. HE THINKS HIS BP MIGHT BE ELEVATED DUE TO HIS KIDNEY STONES AND BEING AT THE UROLOGIST.   I ADVISED HIM TO GET A BP MONITOR AND CHECK HIS BP AT HOME COUPLE TIMES A DAY. RESTING, FEET ON GROUND AND ARM AT HEART LEVEL.     TO CALL US IF HIS NUMBERS ARE TRENDING HIGH AND WE WILL GET HIM IN EARLIER.     PATIENT UNDERSTOOD.

## 2022-07-13 NOTE — TELEPHONE ENCOUNTER
"URGENT CALL BACK REQUEST     Hub staff attempted to follow warm transfer process and was unsuccessful     Caller: Omar Ha    Relationship to patient: Self    Best call back number:     Patient is needing:       Omar Ha (Self) [phone] (H)      PATIENT ALSO WANTED TO ADD THE \"BOTTOM\" NUMBER OF HIS BP READINGS:  109    PATIENT WOULD LIKE TO SPEAK WITH OFFICE TODAY IF POSSIBLE     "

## 2022-07-14 NOTE — TELEPHONE ENCOUNTER
Noted.  Agree with recs for home log.  Stand ready to see (or partner MD) if pressure remain elevated on quality, resting home log.

## 2022-07-21 ENCOUNTER — TELEPHONE (OUTPATIENT)
Dept: INTERNAL MEDICINE | Facility: CLINIC | Age: 52
End: 2022-07-21

## 2022-07-22 ENCOUNTER — OFFICE VISIT (OUTPATIENT)
Dept: INTERNAL MEDICINE | Facility: CLINIC | Age: 52
End: 2022-07-22

## 2022-07-22 VITALS
TEMPERATURE: 97.5 F | OXYGEN SATURATION: 98 % | DIASTOLIC BLOOD PRESSURE: 100 MMHG | BODY MASS INDEX: 28.98 KG/M2 | SYSTOLIC BLOOD PRESSURE: 134 MMHG | WEIGHT: 207 LBS | HEART RATE: 89 BPM | HEIGHT: 71 IN

## 2022-07-22 DIAGNOSIS — I10 ESSENTIAL (PRIMARY) HYPERTENSION: Primary | ICD-10-CM

## 2022-07-22 PROCEDURE — 99213 OFFICE O/P EST LOW 20 MIN: CPT | Performed by: INTERNAL MEDICINE

## 2022-07-22 RX ORDER — LISINOPRIL 10 MG/1
10 TABLET ORAL DAILY
Qty: 30 TABLET | Refills: 5 | Status: SHIPPED | OUTPATIENT
Start: 2022-07-22 | End: 2023-02-13

## 2022-07-22 NOTE — PROGRESS NOTES
Rae Ha is a 51 y.o. male who presents with   Chief Complaint   Patient presents with   • Hypertension       History of Present Illness     Urology noting that BP has been elevated since January.  BP at home running high.  No NSAID use.  No sudafed.  Diet is good.          Review of Systems   Respiratory: Negative.    Cardiovascular: Negative.    Neurological: Positive for dizziness.       The following portions of the patient's history were reviewed and updated as appropriate: allergies, current medications and problem list.    Patient Active Problem List    Diagnosis Date Noted   • Fatty liver 12/31/2020     Note Last Updated: 6/18/2021     Noted on 12/2020 U/S, elevated ALT; resolved ALT in 6/2021 with TLC mods and weight loss.      • IFG (impaired fasting glucose) 12/18/2020     Note Last Updated: 6/18/2021     Resolved with TLC mods in 6/2021.     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 Villasenor in genetics     • Vitamin D deficiency 12/11/2018   • Family hx of colon cancer 07/24/2017     Note Last Updated: 12/4/2017     Overview:   Added automatically from request for surgery [id number]         Current Outpatient Medications on File Prior to Visit   Medication Sig Dispense Refill   • cholecalciferol (VITAMIN D3) 25 MCG (1000 UT) tablet Take 1 tablet by mouth Daily.     • psyllium (METAMUCIL) 58.6 % packet Take 1 packet by mouth Daily.     • tamsulosin (FLOMAX) 0.4 MG capsule 24 hr capsule Take 1 capsule by mouth Daily. 10 capsule 0   • [DISCONTINUED] HYDROcodone-acetaminophen (NORCO) 7.5-325 MG per tablet Take 1 tablet by mouth Every 4 (Four) Hours As Needed for Severe Pain . 12 tablet 0     No  "current facility-administered medications on file prior to visit.       Objective     /100   Pulse 89   Temp 97.5 °F (36.4 °C)   Ht 180.3 cm (70.98\")   Wt 93.9 kg (207 lb)   SpO2 98%   BMI 28.88 kg/m²     Physical Exam  Constitutional:       Appearance: He is well-developed.   HENT:      Head: Atraumatic.   Cardiovascular:      Rate and Rhythm: Normal rate and regular rhythm.      Heart sounds: Normal heart sounds.   Pulmonary:      Effort: Pulmonary effort is normal.      Breath sounds: Normal breath sounds.   Skin:     General: Skin is warm and dry.   Neurological:      Mental Status: He is alert and oriented to person, place, and time.         Assessment & Plan   Diagnoses and all orders for this visit:    1. Essential (primary) hypertension (Primary)  -     CBC & Differential  -     Comprehensive Metabolic Panel    Other orders  -     lisinopril (PRINIVIL,ZESTRIL) 10 MG tablet; Take 1 tablet by mouth Daily.  Dispense: 30 tablet; Refill: 5        Discussion    Patient presents with new onset hypertension.  Add lisinopril.  Discussed with the patient onset on action and the potential side effects including cough.  The patient will let me know of any side effects from the medication.    F/u with Dr. Grayson in August as planned.           Future Appointments   Date Time Provider Department Center   8/1/2022  7:30 AM [facility]   8/12/2022 11:00 AM Andres Grayson MD [facility]   1/9/2023  8:10 AM [facility]   1/16/2023 10:00 AM Andres Grayson MD [facility]         "

## 2022-07-23 LAB
ALBUMIN SERPL-MCNC: 4.7 G/DL (ref 3.8–4.9)
ALBUMIN/GLOB SERPL: 1.9 {RATIO} (ref 1.2–2.2)
ALP SERPL-CCNC: 57 IU/L (ref 44–121)
ALT SERPL-CCNC: 42 IU/L (ref 0–44)
AST SERPL-CCNC: 25 IU/L (ref 0–40)
BASOPHILS # BLD AUTO: 0 X10E3/UL (ref 0–0.2)
BASOPHILS NFR BLD AUTO: 1 %
BILIRUB SERPL-MCNC: 0.6 MG/DL (ref 0–1.2)
BUN SERPL-MCNC: 16 MG/DL (ref 6–24)
BUN/CREAT SERPL: 16 (ref 9–20)
CALCIUM SERPL-MCNC: 9.7 MG/DL (ref 8.7–10.2)
CHLORIDE SERPL-SCNC: 100 MMOL/L (ref 96–106)
CO2 SERPL-SCNC: 26 MMOL/L (ref 20–29)
CREAT SERPL-MCNC: 1.03 MG/DL (ref 0.76–1.27)
EGFRCR SERPLBLD CKD-EPI 2021: 87 ML/MIN/1.73
EOSINOPHIL # BLD AUTO: 0.2 X10E3/UL (ref 0–0.4)
EOSINOPHIL NFR BLD AUTO: 4 %
ERYTHROCYTE [DISTWIDTH] IN BLOOD BY AUTOMATED COUNT: 12.8 % (ref 11.6–15.4)
GLOBULIN SER CALC-MCNC: 2.5 G/DL (ref 1.5–4.5)
GLUCOSE SERPL-MCNC: 94 MG/DL (ref 65–99)
HCT VFR BLD AUTO: 46.4 % (ref 37.5–51)
HGB BLD-MCNC: 16 G/DL (ref 13–17.7)
IMM GRANULOCYTES # BLD AUTO: 0 X10E3/UL (ref 0–0.1)
IMM GRANULOCYTES NFR BLD AUTO: 0 %
LYMPHOCYTES # BLD AUTO: 1.7 X10E3/UL (ref 0.7–3.1)
LYMPHOCYTES NFR BLD AUTO: 30 %
MCH RBC QN AUTO: 30.1 PG (ref 26.6–33)
MCHC RBC AUTO-ENTMCNC: 34.5 G/DL (ref 31.5–35.7)
MCV RBC AUTO: 87 FL (ref 79–97)
MONOCYTES # BLD AUTO: 0.6 X10E3/UL (ref 0.1–0.9)
MONOCYTES NFR BLD AUTO: 10 %
NEUTROPHILS # BLD AUTO: 3.1 X10E3/UL (ref 1.4–7)
NEUTROPHILS NFR BLD AUTO: 55 %
PLATELET # BLD AUTO: 221 X10E3/UL (ref 150–450)
POTASSIUM SERPL-SCNC: 4.3 MMOL/L (ref 3.5–5.2)
PROT SERPL-MCNC: 7.2 G/DL (ref 6–8.5)
RBC # BLD AUTO: 5.31 X10E6/UL (ref 4.14–5.8)
SODIUM SERPL-SCNC: 141 MMOL/L (ref 134–144)
WBC # BLD AUTO: 5.5 X10E3/UL (ref 3.4–10.8)

## 2022-07-25 ENCOUNTER — TELEPHONE (OUTPATIENT)
Dept: INTERNAL MEDICINE | Facility: CLINIC | Age: 52
End: 2022-07-25

## 2022-07-25 NOTE — TELEPHONE ENCOUNTER
Pt informed    ----- Message from Diann Tao MD sent at 7/25/2022  8:52 AM EDT -----  Blood work is normal.

## 2022-08-01 ENCOUNTER — PRE-ADMISSION TESTING (OUTPATIENT)
Dept: PREADMISSION TESTING | Facility: HOSPITAL | Age: 52
End: 2022-08-01

## 2022-08-01 VITALS
TEMPERATURE: 97.6 F | HEART RATE: 60 BPM | RESPIRATION RATE: 16 BRPM | WEIGHT: 208 LBS | BODY MASS INDEX: 29.12 KG/M2 | OXYGEN SATURATION: 100 % | SYSTOLIC BLOOD PRESSURE: 122 MMHG | DIASTOLIC BLOOD PRESSURE: 79 MMHG | HEIGHT: 71 IN

## 2022-08-01 LAB
QT INTERVAL: 370 MS
SARS-COV-2 ORF1AB RESP QL NAA+PROBE: NOT DETECTED

## 2022-08-01 PROCEDURE — C9803 HOPD COVID-19 SPEC COLLECT: HCPCS | Performed by: NURSE PRACTITIONER

## 2022-08-01 PROCEDURE — 93005 ELECTROCARDIOGRAM TRACING: CPT

## 2022-08-01 PROCEDURE — 93010 ELECTROCARDIOGRAM REPORT: CPT | Performed by: INTERNAL MEDICINE

## 2022-08-01 PROCEDURE — U0004 COV-19 TEST NON-CDC HGH THRU: HCPCS | Performed by: NURSE PRACTITIONER

## 2022-08-01 NOTE — DISCHARGE INSTRUCTIONS
Take the following medications the morning of surgery with a small sip of water:    NONE    If you are on prescription narcotic pain medication to control your pain you may also take that medication the morning of surgery.    General Instructions:  Do not eat or drink anything after midnight the night before surgery.  Patients who avoid alcohol for 4 weeks prior to surgery have a reduced risk of post-operative complications.    Do not drink alcohol the day of surgery.   If applicable bring your C-PAP  machine.  Bring any papers given to you in the doctor’s office.  Wear clean comfortable clothes.  Do not wear contact lenses, false eyelashes or make-up.  Bring a case for your glasses.   Remove all piercings.  Leave jewelry and any other valuables at home.  The Pre-Admission Testing nurse will instruct you to bring medications if unable to obtain an accurate list in Pre-Admission Testing.    REPORT TO SURGERY ENTRANCE 8-3-2022 AT 0530 AM          Preventing a Surgical Site Infection:  For 2 to 3 days before surgery, avoid shaving with a razor because the razor can irritate skin and make it easier to develop an infection.    Any areas of open skin can increase the risk of a post-operative wound infection by allowing bacteria to enter and travel throughout the body.  Notify your surgeon if you have any skin wounds / rashes even if it is not near the expected surgical site.  The area will need assessed to determine if surgery should be delayed until it is healed.  The night prior to surgery shower using a fresh bar of anti-bacterial soap (such as Dial) and clean washcloth.  Sleep in a clean bed with clean clothing.  Do not allow pets to sleep with you.  Shower on the morning of surgery using a fresh bar of anti-bacterial soap (such as Dial) and clean washcloth.  Dry with a clean towel and dress in clean clothing.  Ask your surgeon if you will be receiving antibiotics prior to surgery.  Make sure you, your family, and all  healthcare providers clean their hands with soap and water or an alcohol based hand  before caring for you or your wound.    Day of surgery:  Your arrival time is approximately two hours before your scheduled surgery time.  Upon arrival, a Pre-op nurse and Anesthesiologist will review your health history, obtain vital signs, and answer questions you may have.  The only belongings needed at this time will be your home medications and if applicable your C-PAP/BI-PAP machine.  A Pre-op nurse will start an IV and you may receive medication in preparation for surgery, including something to help you relax.      Please be aware that surgery does come with discomfort.  We want to make every effort to control your discomfort so please discuss any uncontrolled symptoms with your nurse.   Your doctor will most likely have prescribed pain medications.      If you are going home after surgery you will receive individualized written care instructions before being discharged.  A responsible adult must drive you to and from the hospital on the day of your surgery and stay with you for 24 hours.  Discharge prescriptions can be filled by the hospital pharmacy during regular pharmacy hours.  If you are having surgery late in the day/evening your prescription may be e-prescribed to your pharmacy.  Please verify your pharmacy hours or chose a 24 hour pharmacy to avoid not having access to your prescription because your pharmacy has closed for the day.        If you have any questions please call Pre-Admission Testing at [phone].  Deductibles and co-payments are collected on the day of service. Please be prepared to pay the required co-pay, deductible or deposit on the day of service as defined by your plan.    Patient Education for Self-Quarantine Process    Following your COVID testing, we strongly recommend that you wear a mask when you are with other people and practice social distancing.   Limit your activities to  only required outings.  Wash your hands with soap and water frequently for at least 20 seconds.   Avoid touching your eyes, nose and mouth with unwashed hands.  Do not share anything - utensils, drinking glasses, food from the same bowl.   Sanitize household surfaces daily. Include all high touch areas (door handles, light switches, phones, countertops, etc.)    Call your surgeon immediately if you experience any of the following symptoms:  Sore Throat  Shortness of Breath or difficulty breathing  Cough  Chills  Body soreness or muscle pain  Headache  Fever  New loss of taste or smell  Do not arrive for your surgery ill.  Your procedure will need to be rescheduled to another time.  You will need to call your physician before the day of surgery to avoid any unnecessary exposure to hospital staff as well as other patients.

## 2022-08-03 ENCOUNTER — APPOINTMENT (OUTPATIENT)
Dept: GENERAL RADIOLOGY | Facility: HOSPITAL | Age: 52
End: 2022-08-03

## 2022-08-03 ENCOUNTER — ANESTHESIA (OUTPATIENT)
Dept: PERIOP | Facility: HOSPITAL | Age: 52
End: 2022-08-03

## 2022-08-03 ENCOUNTER — ANESTHESIA EVENT (OUTPATIENT)
Dept: PERIOP | Facility: HOSPITAL | Age: 52
End: 2022-08-03

## 2022-08-03 ENCOUNTER — HOSPITAL ENCOUNTER (OUTPATIENT)
Facility: HOSPITAL | Age: 52
Setting detail: HOSPITAL OUTPATIENT SURGERY
Discharge: HOME | End: 2022-08-03
Attending: UROLOGY | Admitting: UROLOGY

## 2022-08-03 VITALS
SYSTOLIC BLOOD PRESSURE: 125 MMHG | TEMPERATURE: 98.7 F | OXYGEN SATURATION: 96 % | RESPIRATION RATE: 16 BRPM | DIASTOLIC BLOOD PRESSURE: 96 MMHG | HEIGHT: 71 IN | BODY MASS INDEX: 29.23 KG/M2 | HEART RATE: 71 BPM | WEIGHT: 208.8 LBS

## 2022-08-03 DIAGNOSIS — N20.1 LEFT URETERAL STONE: Primary | ICD-10-CM

## 2022-08-03 PROCEDURE — 25010000002 CEFAZOLIN IN DEXTROSE 2-4 GM/100ML-% SOLUTION: Performed by: UROLOGY

## 2022-08-03 PROCEDURE — 25010000002 PROPOFOL 10 MG/ML EMULSION: Performed by: NURSE ANESTHETIST, CERTIFIED REGISTERED

## 2022-08-03 PROCEDURE — 25010000002 ONDANSETRON PER 1 MG: Performed by: NURSE ANESTHETIST, CERTIFIED REGISTERED

## 2022-08-03 PROCEDURE — 25010000002 IOPAMIDOL 61 % SOLUTION: Performed by: UROLOGY

## 2022-08-03 PROCEDURE — 74420 UROGRAPHY RTRGR +-KUB: CPT

## 2022-08-03 PROCEDURE — 25010000002 FENTANYL CITRATE (PF) 50 MCG/ML SOLUTION: Performed by: NURSE ANESTHETIST, CERTIFIED REGISTERED

## 2022-08-03 PROCEDURE — C2617 STENT, NON-COR, TEM W/O DEL: HCPCS | Performed by: UROLOGY

## 2022-08-03 PROCEDURE — C1758 CATHETER, URETERAL: HCPCS | Performed by: UROLOGY

## 2022-08-03 PROCEDURE — 25010000002 MIDAZOLAM PER 1 MG: Performed by: ANESTHESIOLOGY

## 2022-08-03 PROCEDURE — C1769 GUIDE WIRE: HCPCS | Performed by: UROLOGY

## 2022-08-03 PROCEDURE — 25010000002 DEXAMETHASONE PER 1 MG: Performed by: NURSE ANESTHETIST, CERTIFIED REGISTERED

## 2022-08-03 DEVICE — URETERAL STENT
Type: IMPLANTABLE DEVICE | Site: URETER | Status: FUNCTIONAL
Brand: PERCUFLEX™ PLUS

## 2022-08-03 RX ORDER — EPHEDRINE SULFATE 50 MG/ML
5 INJECTION, SOLUTION INTRAVENOUS ONCE AS NEEDED
Status: DISCONTINUED | OUTPATIENT
Start: 2022-08-03 | End: 2022-08-03 | Stop reason: HOSPADM

## 2022-08-03 RX ORDER — HYDROMORPHONE HYDROCHLORIDE 1 MG/ML
0.5 INJECTION, SOLUTION INTRAMUSCULAR; INTRAVENOUS; SUBCUTANEOUS
Status: DISCONTINUED | OUTPATIENT
Start: 2022-08-03 | End: 2022-08-03 | Stop reason: HOSPADM

## 2022-08-03 RX ORDER — HYDROCODONE BITARTRATE AND ACETAMINOPHEN 7.5; 325 MG/1; MG/1
1 TABLET ORAL ONCE AS NEEDED
Status: DISCONTINUED | OUTPATIENT
Start: 2022-08-03 | End: 2022-08-03 | Stop reason: HOSPADM

## 2022-08-03 RX ORDER — HYDRALAZINE HYDROCHLORIDE 20 MG/ML
5 INJECTION INTRAMUSCULAR; INTRAVENOUS
Status: DISCONTINUED | OUTPATIENT
Start: 2022-08-03 | End: 2022-08-03 | Stop reason: HOSPADM

## 2022-08-03 RX ORDER — MIDAZOLAM HYDROCHLORIDE 1 MG/ML
1 INJECTION INTRAMUSCULAR; INTRAVENOUS
Status: DISCONTINUED | OUTPATIENT
Start: 2022-08-03 | End: 2022-08-03 | Stop reason: HOSPADM

## 2022-08-03 RX ORDER — FENTANYL CITRATE 50 UG/ML
50 INJECTION, SOLUTION INTRAMUSCULAR; INTRAVENOUS
Status: DISCONTINUED | OUTPATIENT
Start: 2022-08-03 | End: 2022-08-03 | Stop reason: HOSPADM

## 2022-08-03 RX ORDER — ONDANSETRON 2 MG/ML
4 INJECTION INTRAMUSCULAR; INTRAVENOUS ONCE AS NEEDED
Status: DISCONTINUED | OUTPATIENT
Start: 2022-08-03 | End: 2022-08-03 | Stop reason: HOSPADM

## 2022-08-03 RX ORDER — PROMETHAZINE HYDROCHLORIDE 25 MG/1
25 SUPPOSITORY RECTAL ONCE AS NEEDED
Status: DISCONTINUED | OUTPATIENT
Start: 2022-08-03 | End: 2022-08-03 | Stop reason: HOSPADM

## 2022-08-03 RX ORDER — ONDANSETRON 2 MG/ML
INJECTION INTRAMUSCULAR; INTRAVENOUS AS NEEDED
Status: DISCONTINUED | OUTPATIENT
Start: 2022-08-03 | End: 2022-08-03 | Stop reason: SURG

## 2022-08-03 RX ORDER — MAGNESIUM HYDROXIDE 1200 MG/15ML
LIQUID ORAL AS NEEDED
Status: DISCONTINUED | OUTPATIENT
Start: 2022-08-03 | End: 2022-08-03 | Stop reason: HOSPADM

## 2022-08-03 RX ORDER — DIPHENHYDRAMINE HYDROCHLORIDE 50 MG/ML
12.5 INJECTION INTRAMUSCULAR; INTRAVENOUS
Status: DISCONTINUED | OUTPATIENT
Start: 2022-08-03 | End: 2022-08-03 | Stop reason: HOSPADM

## 2022-08-03 RX ORDER — SODIUM CHLORIDE, SODIUM LACTATE, POTASSIUM CHLORIDE, CALCIUM CHLORIDE 600; 310; 30; 20 MG/100ML; MG/100ML; MG/100ML; MG/100ML
9 INJECTION, SOLUTION INTRAVENOUS CONTINUOUS
Status: DISCONTINUED | OUTPATIENT
Start: 2022-08-03 | End: 2022-08-03 | Stop reason: HOSPADM

## 2022-08-03 RX ORDER — CEFAZOLIN SODIUM 2 G/100ML
2 INJECTION, SOLUTION INTRAVENOUS ONCE
Status: COMPLETED | OUTPATIENT
Start: 2022-08-03 | End: 2022-08-03

## 2022-08-03 RX ORDER — LABETALOL HYDROCHLORIDE 5 MG/ML
5 INJECTION, SOLUTION INTRAVENOUS
Status: DISCONTINUED | OUTPATIENT
Start: 2022-08-03 | End: 2022-08-03 | Stop reason: HOSPADM

## 2022-08-03 RX ORDER — CEPHALEXIN 500 MG/1
500 CAPSULE ORAL 3 TIMES DAILY
Qty: 15 CAPSULE | Refills: 0 | Status: SHIPPED | OUTPATIENT
Start: 2022-08-03 | End: 2022-08-08

## 2022-08-03 RX ORDER — DIPHENHYDRAMINE HCL 25 MG
25 CAPSULE ORAL
Status: DISCONTINUED | OUTPATIENT
Start: 2022-08-03 | End: 2022-08-03 | Stop reason: HOSPADM

## 2022-08-03 RX ORDER — SODIUM CHLORIDE 0.9 % (FLUSH) 0.9 %
3 SYRINGE (ML) INJECTION EVERY 12 HOURS SCHEDULED
Status: DISCONTINUED | OUTPATIENT
Start: 2022-08-03 | End: 2022-08-03 | Stop reason: HOSPADM

## 2022-08-03 RX ORDER — NALOXONE HCL 0.4 MG/ML
0.2 VIAL (ML) INJECTION AS NEEDED
Status: DISCONTINUED | OUTPATIENT
Start: 2022-08-03 | End: 2022-08-03 | Stop reason: HOSPADM

## 2022-08-03 RX ORDER — HYDROMORPHONE HYDROCHLORIDE 2 MG/1
2 TABLET ORAL EVERY 4 HOURS PRN
Qty: 30 TABLET | Refills: 0 | Status: SHIPPED | OUTPATIENT
Start: 2022-08-03 | End: 2022-10-03

## 2022-08-03 RX ORDER — IBUPROFEN 600 MG/1
600 TABLET ORAL ONCE AS NEEDED
Status: DISCONTINUED | OUTPATIENT
Start: 2022-08-03 | End: 2022-08-03 | Stop reason: HOSPADM

## 2022-08-03 RX ORDER — PROPOFOL 10 MG/ML
VIAL (ML) INTRAVENOUS AS NEEDED
Status: DISCONTINUED | OUTPATIENT
Start: 2022-08-03 | End: 2022-08-03 | Stop reason: SURG

## 2022-08-03 RX ORDER — FLUMAZENIL 0.1 MG/ML
0.2 INJECTION INTRAVENOUS AS NEEDED
Status: DISCONTINUED | OUTPATIENT
Start: 2022-08-03 | End: 2022-08-03 | Stop reason: HOSPADM

## 2022-08-03 RX ORDER — FAMOTIDINE 10 MG/ML
20 INJECTION, SOLUTION INTRAVENOUS ONCE
Status: COMPLETED | OUTPATIENT
Start: 2022-08-03 | End: 2022-08-03

## 2022-08-03 RX ORDER — OXYCODONE AND ACETAMINOPHEN 7.5; 325 MG/1; MG/1
1 TABLET ORAL EVERY 4 HOURS PRN
Status: DISCONTINUED | OUTPATIENT
Start: 2022-08-03 | End: 2022-08-03 | Stop reason: HOSPADM

## 2022-08-03 RX ORDER — LIDOCAINE HYDROCHLORIDE 10 MG/ML
0.5 INJECTION, SOLUTION EPIDURAL; INFILTRATION; INTRACAUDAL; PERINEURAL ONCE AS NEEDED
Status: DISCONTINUED | OUTPATIENT
Start: 2022-08-03 | End: 2022-08-03 | Stop reason: HOSPADM

## 2022-08-03 RX ORDER — PROMETHAZINE HYDROCHLORIDE 25 MG/1
25 TABLET ORAL ONCE AS NEEDED
Status: DISCONTINUED | OUTPATIENT
Start: 2022-08-03 | End: 2022-08-03 | Stop reason: HOSPADM

## 2022-08-03 RX ORDER — LIDOCAINE HYDROCHLORIDE 20 MG/ML
INJECTION, SOLUTION INFILTRATION; PERINEURAL AS NEEDED
Status: DISCONTINUED | OUTPATIENT
Start: 2022-08-03 | End: 2022-08-03 | Stop reason: SURG

## 2022-08-03 RX ORDER — DEXAMETHASONE SODIUM PHOSPHATE 10 MG/ML
INJECTION INTRAMUSCULAR; INTRAVENOUS AS NEEDED
Status: DISCONTINUED | OUTPATIENT
Start: 2022-08-03 | End: 2022-08-03 | Stop reason: SURG

## 2022-08-03 RX ORDER — FENTANYL CITRATE 50 UG/ML
INJECTION, SOLUTION INTRAMUSCULAR; INTRAVENOUS AS NEEDED
Status: DISCONTINUED | OUTPATIENT
Start: 2022-08-03 | End: 2022-08-03 | Stop reason: SURG

## 2022-08-03 RX ORDER — SODIUM CHLORIDE 0.9 % (FLUSH) 0.9 %
3-10 SYRINGE (ML) INJECTION AS NEEDED
Status: DISCONTINUED | OUTPATIENT
Start: 2022-08-03 | End: 2022-08-03 | Stop reason: HOSPADM

## 2022-08-03 RX ADMIN — FENTANYL CITRATE 25 MCG: 50 INJECTION INTRAMUSCULAR; INTRAVENOUS at 08:35

## 2022-08-03 RX ADMIN — SODIUM CHLORIDE, POTASSIUM CHLORIDE, SODIUM LACTATE AND CALCIUM CHLORIDE 9 ML/HR: 600; 310; 30; 20 INJECTION, SOLUTION INTRAVENOUS at 06:20

## 2022-08-03 RX ADMIN — MIDAZOLAM 1 MG: 1 INJECTION INTRAMUSCULAR; INTRAVENOUS at 06:38

## 2022-08-03 RX ADMIN — PROPOFOL 200 MG: 10 INJECTION, EMULSION INTRAVENOUS at 07:56

## 2022-08-03 RX ADMIN — FENTANYL CITRATE 25 MCG: 50 INJECTION INTRAMUSCULAR; INTRAVENOUS at 08:00

## 2022-08-03 RX ADMIN — FENTANYL CITRATE 25 MCG: 50 INJECTION INTRAMUSCULAR; INTRAVENOUS at 08:02

## 2022-08-03 RX ADMIN — LIDOCAINE HYDROCHLORIDE 100 MG: 20 INJECTION, SOLUTION INFILTRATION; PERINEURAL at 07:56

## 2022-08-03 RX ADMIN — FAMOTIDINE 20 MG: 10 INJECTION INTRAVENOUS at 06:37

## 2022-08-03 RX ADMIN — FENTANYL CITRATE 25 MCG: 50 INJECTION INTRAMUSCULAR; INTRAVENOUS at 08:08

## 2022-08-03 RX ADMIN — DEXAMETHASONE SODIUM PHOSPHATE 4 MG: 10 INJECTION INTRAMUSCULAR; INTRAVENOUS at 07:57

## 2022-08-03 RX ADMIN — ONDANSETRON 4 MG: 2 INJECTION INTRAMUSCULAR; INTRAVENOUS at 07:57

## 2022-08-03 RX ADMIN — CEFAZOLIN SODIUM 2 G: 2 INJECTION, SOLUTION INTRAVENOUS at 07:46

## 2022-08-03 NOTE — ANESTHESIA PROCEDURE NOTES
Airway  Urgency: elective    Date/Time: 8/3/2022 7:56 AM    General Information and Staff    Patient location during procedure: OR  CRNA/CAA: Sivan Mccarthy CRNA    Indications and Patient Condition  Indications for airway management: airway protection    Preoxygenated: yes  Mask difficulty assessment: 0 - not attempted    Final Airway Details  Final airway type: supraglottic airway      Successful airway: unique  Size 4    Number of attempts at approach: 1    Additional Comments  Atraumatic placement of Unique LMA. Cuff  WNL. Dentition unchanged from preop.

## 2022-08-03 NOTE — ANESTHESIA PREPROCEDURE EVALUATION
Anesthesia Evaluation     Patient summary reviewed and Nursing notes reviewed   NPO Solid Status: > 8 hours  NPO Liquid Status: > 2 hours           Airway   Mallampati: II  Dental - normal exam     Comment: Risk of dental injury discussed with patient    Pulmonary - normal exam   (+) sleep apnea,   Cardiovascular - normal exam    ECG reviewed    (+) hypertension,       Neuro/Psych  GI/Hepatic/Renal/Endo    (+)   liver disease fatty liver disease, renal disease stones,     Musculoskeletal     Abdominal    Substance History      OB/GYN          Other   arthritis,                    Anesthesia Plan    ASA 2     general     (I have reviewed all pertinent information including medical history,allergies, imaging, studies and laboratory results. I have explained risks and benefits to anesthesia, including but not limited to; dental damage, corneal abrasion, sore throat, nausea, vomiting, aspiration, nerve damage, failed block, MI,stroke and death. Patient has agreed to proceed. )    Anesthetic plan, risks, benefits, and alternatives have been provided, discussed and informed consent has been obtained with: patient.        CODE STATUS:

## 2022-08-03 NOTE — ANESTHESIA POSTPROCEDURE EVALUATION
Patient: Omar Ha    Procedure Summary     Date: 08/03/22 Room / Location: [facility] OR 01 / [facility]    Anesthesia Start: 0752 Anesthesia Stop: 0849    Procedure: CYSTOSCOPY, BILATERAL RETROGRADE PYELOGRAM, LEFT URETEROSCOPY LASER LITHOTRIPSY, STENT PLACEMENT (Left ) Diagnosis:     Surgeons: Ryder Sanchez MD Provider: Komal Doe MD    Anesthesia Type: general ASA Status: 2          Anesthesia Type: general    Vitals  Vitals Value Taken Time   /102 08/03/22 0917   Temp 37.1 °C (98.7 °F) 08/03/22 0915   Pulse 75 08/03/22 0919   Resp 14 08/03/22 0915   SpO2 93 % 08/03/22 0919   Vitals shown include unvalidated device data.        Post Anesthesia Care and Evaluation    Patient location during evaluation: PACU  Patient participation: complete - patient participated  Level of consciousness: awake and alert  Pain management: adequate    Airway patency: patent  Anesthetic complications: No anesthetic complications    Cardiovascular status: acceptable  Respiratory status: acceptable  Hydration status: acceptable    Comments: ---------------------------               08/03/22 0915         ---------------------------   BP:        145/97      Pulse:         74           Resp:          14           Temp:   37.1 °C (98.7 °F)   SpO2:          93%         ---------------------------

## 2022-08-03 NOTE — H&P
[facility]  Ryder Sanchez MD    Patient Care Team:  Andres Grayson MD as PCP - General (Internal Medicine)    Chief complaint: Left ureteral stone    Subjective .     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  We have been watching a distal left ureteral stone which has been nonobstructing.  He would like to have the stone removed at this point.    Review of Systems  All systems were reviewed and were negative except for no gross hematuria presently.  No chest pain.  No shortness of air.    History  Past Medical History:   Diagnosis Date   • Chronic bilateral low back pain without sciatica 12/04/2017   • Environmental allergies 12/16/2019   • External hemorrhoids 12/16/2019    Hx bleeding in 2019   • Gastric polyps 2020   • History of colon polyps    • Hypertension    • Kidney stone on left side 07/2022   • Nephrolithiasis 2007    unknown type of stone   • Sleep apnea     WAITING FOR C-PAP   • Vitamin D deficiency 12/11/2018   ,   Past Surgical History:   Procedure Laterality Date   • COLONOSCOPY  2020   • ENDOSCOPY  2020   • PATELLA FRACTURE SURGERY Right 1986   ,   Family History   Problem Relation Age of Onset   • Mitral valve prolapse Mother    • Uterine cancer Mother    • Colon cancer Mother         Lindquist Syndrome   • Hypertension Mother    • Hypothyroidism Mother    • Melanoma Mother    • Squamous cell carcinoma Mother    • Leukemia Father         CLL   • Skin cancer Father    • Bell's palsy Father    • Hypertension Father    • Hyperlipidemia Father    • Heart failure Father    • Heart disease Father         CABG x 4   • Skin cancer Sister    • Colon cancer Brother         Lindquist Syndrome   • No Known Problems Daughter    • No Known Problems Daughter    • No Known Problems Son    • Malig Hyperthermia Neg Hx    ,   Social History     Tobacco Use   • Smoking status: Never Smoker   • Smokeless tobacco: Never Used   Vaping Use   • Vaping Use: Never used   Substance  Use Topics   • Alcohol use: Yes     Comment: 2-4 drinks/ month   • Drug use: No   ,   Medications Prior to Admission   Medication Sig Dispense Refill Last Dose   • cholecalciferol (VITAMIN D3) 25 MCG (1000 UT) tablet Take 1 tablet by mouth Daily.   8/2/2022 at 0800   • lisinopril (PRINIVIL,ZESTRIL) 10 MG tablet Take 1 tablet by mouth Daily. 30 tablet 5 8/2/2022 at 0800   • psyllium (METAMUCIL) 58.6 % packet Take 1 packet by mouth Daily.   8/2/2022 at 0800   , Scheduled Meds:  ceFAZolin, 2 g, Intravenous, Once  sodium chloride, 3 mL, Intravenous, Q12H    , Continuous Infusions:  lactated ringers, 9 mL/hr, Last Rate: 9 mL/hr (08/03/22 0620)    , PRN Meds:  diphenhydrAMINE  •  diphenhydrAMINE  •  ePHEDrine  •  fentanyl  •  fentanyl  •  flumazenil  •  hydrALAZINE  •  HYDROcodone-acetaminophen  •  HYDROmorphone  •  ibuprofen  •  labetalol  •  lidocaine PF 1%  •  midazolam  •  naloxone  •  ondansetron  •  oxyCODONE-acetaminophen  •  promethazine **OR** promethazine  •  sodium chloride, Allergies:  Morphine and     Objective     Vital Signs   Temp:  [98.3 °F (36.8 °C)] 98.3 °F (36.8 °C)  Heart Rate:  [73-90] 73  Resp:  [18] 18  BP: (152)/(98) 152/98    Physical Exam:     General Appearance:  Alert, cooperative, in no acute distress   Head:  Normocephalic, without obvious abnormality, atraumatic   Eyes:           Lids and lashes normal, conjunctivae and sclerae normal, no icterus, no pallor, corneas clear, PERRLA   Ears:  Ears appear intact with no abnormalities noted   Throat: No oral lesions, no thrush, oral mucosa moist   Neck: No adenopathy, supple, trachea midline   Back:   No kyphosis present, no scoliosis present, no skin lesions,    erythema or scars, no tenderness to percussion or                   palpation, range of motion normal   Lungs:   Clear to auscultation,respirations regular, even and                unlabored    Heart:  Regular rhythm and normal rate, normal S1 and S2, no         murmur, no gallop, no  rub, no click   Chest Wall:  No abnormalities observed   Abdomen:   Normal bowel sounds, no masses, no organomegaly, soft     nontender, nondistended, no guarding, no rebound                tenderness   Genital/Rectal:    Deferred to the procedure   Extremities: Moves all extremities well, no edema, no cyanosis, no           redness       Skin: No bleeding, bruising or rash       Neurologic: Cranial nerves 2 - 12 grossly intact, sensation intact,       Results Review:   I reviewed the patient's new clinical results.      Assessment & Plan       * No active hospital problems. *      Distal left ureteral stone  History of Lindquist syndrome    I discussed the patients findings and my recommendations with patient, his family, nursing staff.  We discussed this procedure in detail.  He knows risk occluding pain, infection, bleeding.  We intend to perform cystoscopy with retrograde pyelography, left ureteroscopy with laser destruction stone and stent placement.    Ryder Sanchez MD  08/03/22  07:49 EDT    Time: Preoperative evaluation and discussion

## 2022-08-12 ENCOUNTER — OFFICE VISIT (OUTPATIENT)
Dept: INTERNAL MEDICINE | Facility: CLINIC | Age: 52
End: 2022-08-12

## 2022-08-12 VITALS
TEMPERATURE: 97.7 F | WEIGHT: 200.1 LBS | OXYGEN SATURATION: 97 % | BODY MASS INDEX: 28.01 KG/M2 | SYSTOLIC BLOOD PRESSURE: 130 MMHG | DIASTOLIC BLOOD PRESSURE: 84 MMHG | HEIGHT: 71 IN | HEART RATE: 86 BPM

## 2022-08-12 DIAGNOSIS — I10 PRIMARY HYPERTENSION: Primary | ICD-10-CM

## 2022-08-12 DIAGNOSIS — I10 ESSENTIAL HYPERTENSION: ICD-10-CM

## 2022-08-12 DIAGNOSIS — N20.1 LEFT URETERAL STONE: ICD-10-CM

## 2022-08-12 DIAGNOSIS — G47.33 OSA (OBSTRUCTIVE SLEEP APNEA): ICD-10-CM

## 2022-08-12 PROCEDURE — 99213 OFFICE O/P EST LOW 20 MIN: CPT | Performed by: INTERNAL MEDICINE

## 2022-08-12 RX ORDER — METHENAMINE, SODIUM PHOSPHATE, MONOBASIC, MONOHYDRATE, PHENYL SALICYLATE, METHYLENE BLUE, AND HYOSCYAMINE SULFATE 120; 40.8; 36; 10; .12 MG/1; MG/1; MG/1; MG/1; MG/1
CAPSULE ORAL
COMMUNITY
Start: 2022-08-03 | End: 2023-01-16

## 2022-08-12 NOTE — PROGRESS NOTES
Hypertension (Follow up )      KINGSTON Ha is a 51 y.o. male RTC In f/u:    Nephrolithiasis, occurred 10 years ago, passed. Recurred recenlty and had removed last week.  Started 2/2022 and stone progressed down ureter and then 'stopped' in mid ureter.  Reviewed recent serial imaging and decided to go in and get stone.  Noted with 'each visit I noted the blood pressure getting higher and higher'.  Peaked at 168/113 and was persistent.  Called here and told to check home log.  Pt did start checking at home, 'dropped back down to ~130/ 90's. Last month was sitting at desk at rest, 'normal day', and checked and got 160's again on check.  Made appt and saw Dr. Tao. Started on lisinopril on ~7/2022.  Tolerating med OK.  Office numbers 120's/ 70's.  Home numbers are 120's/ mid 80's.      Dx'd with MARGOT since last visit.  Has been waiting '16 weeks' for my machine and is supposed to get on Monday.   Drinking beet juice daily for pressure and drinking one glass tart cherry juice for gout hx.      Review of Systems   Constitutional: Negative for chills and fever.   Cardiovascular: Negative for chest pain and dyspnea on exertion.   Respiratory: Positive for snoring. Negative for shortness of breath and sleep disturbances due to breathing.    Genitourinary: Negative for dysuria and frequency.   Neurological: Positive for excessive daytime sleepiness. Negative for dizziness and light-headedness.       The following portions of the patient's history were reviewed and updated as appropriate: allergies, current medications, past medical history, past social history and problem list.      Current Outpatient Medications:   •  cholecalciferol (VITAMIN D3) 25 MCG (1000 UT) tablet, Take 1 tablet by mouth Daily., Disp:  , Rfl:   •  lisinopril (PRINIVIL,ZESTRIL) 10 MG tablet, Take 1 tablet by mouth Daily., Disp: 30 tablet, Rfl: 5  •  psyllium (METAMUCIL) 58.6 % packet, Take 1 packet by mouth Daily., Disp: , Rfl:   •  HYDROmorphone  "(Dilaudid) 2 MG tablet, Take 1 tablet by mouth Every 4 (Four) Hours As Needed for Moderate Pain ., Disp: 30 tablet, Rfl: 0  •  uribel (URO-MP) 118 MG capsule capsule, TAKE 1 CAPSULE BY MOUTH EVERY 6 HOURS AS NEEDED, Disp: , Rfl:     Vitals:    08/12/22 1100   BP: 130/84   BP Location: Right arm   Patient Position: Sitting   Cuff Size: Small Adult   Pulse: 86   Temp: 97.7 °F (36.5 °C)   SpO2: 97%   Weight: 90.8 kg (200 lb 1.6 oz)   Height: 180.3 cm (71\")     Body mass index is 27.91 kg/m².    Pt machine 130/97      Physical Exam  Vitals reviewed.   Constitutional:       General: He is not in acute distress.     Appearance: He is well-developed. He is not ill-appearing or toxic-appearing.   HENT:      Head: Normocephalic.   Pulmonary:      Effort: Pulmonary effort is normal. No respiratory distress.   Neurological:      General: No focal deficit present.      Mental Status: He is alert and oriented to person, place, and time.      Gait: Gait normal.   Psychiatric:         Behavior: Behavior normal.         Thought Content: Thought content normal.         Assessment/ Plan  Diagnoses and all orders for this visit:    Primary hypertension    Essential hypertension  -     Basic Metabolic Panel    MARGOT (obstructive sleep apnea)    Left ureteral stone    Other orders  -     uribel (URO-MP) 118 MG capsule capsule; TAKE 1 CAPSULE BY MOUTH EVERY 6 HOURS AS NEEDED        Return for Next scheduled follow up.      Discussion:  Omar Ha is a 51 y.o. male with hx of recurrent nephrolithiasis on watchful waiting course with urology last ~6 months, ultimately moving to CYSTOSCOPY, BILATERAL RETROGRADE PYELOGRAM, LEFT URETEROSCOPY LASER LITHOTRIPSY, STENT PLACEMENT 8/3/22.  During monitoring pt noted persistent blood pressure elevation in office at urology and at home. Placed on low dose ACE-I by partner MD in 7/2022. Good tolerance, improved home numbers.   In interim, dx'd with MARGOT and pt due for CPAP delivery on Monday next week. "  D/W pt likely strong contribution of this issue to elevated and variable blood pressure numbers.   C/W current ACE-I daily.  Check BMP today. C/W home log noting home cuff overreading diastolic a bit in office. Will monitor pressure trend and need for continued med use as pt starts CPAP and manages MARGOT.  Counseled at length today.     RTC as planned 1/2023 annual visit.

## 2022-08-13 LAB
BUN SERPL-MCNC: 18 MG/DL (ref 6–24)
BUN/CREAT SERPL: 17 (ref 9–20)
CALCIUM SERPL-MCNC: 9.9 MG/DL (ref 8.7–10.2)
CHLORIDE SERPL-SCNC: 102 MMOL/L (ref 96–106)
CO2 SERPL-SCNC: 25 MMOL/L (ref 20–29)
CREAT SERPL-MCNC: 1.05 MG/DL (ref 0.76–1.27)
EGFRCR SERPLBLD CKD-EPI 2021: 85 ML/MIN/1.73
GLUCOSE SERPL-MCNC: 96 MG/DL (ref 65–99)
POTASSIUM SERPL-SCNC: 4.6 MMOL/L (ref 3.5–5.2)
SODIUM SERPL-SCNC: 141 MMOL/L (ref 134–144)

## 2022-08-23 NOTE — OP NOTE
[facility]  Ryder Sanchez MD      URETEROSCOPY LASER LITHOTRIPSY WITH STENT INSERTION  Procedure Note    Omar Ha  8/3/2022    Pre-op Diagnosis:   Distal left ureteral stone, history of Lindquist syndrome    Post-Op Diagnosis Codes:  Same    Procedure(s):  CYSTOSCOPY, BILATERAL RETROGRADE PYELOGRAM, LEFT URETEROSCOPY LASER LITHOTRIPSY, STENT PLACEMENT    Surgeon(s):  Ryder Sanchez MD    Anesthesia: General    Surgical Assistant: Staff    Staff:   Circulator: Verona Patterson RN  Laser Staff: Deo Coley  Radiology Technologist: Tara Man  Scrub Person: Radha Lujan RN    Findings: Distal left ureteral stone    Description of Procedure: After adequate induction with general anesthesia the patient was placed in the modified supine lithotomy position on the operating table and prepped and draped in a sterile fashion over the genitalia.  Cystoscopy was performed with a 23 Irish sheath and 30 degree and 70 degree lenses.  Urethra was normal.  Prostate showed mild hypertrophy.  Inspection of the bladder was normal without tumors or stones.  Bilateral retrograde pyelograms were performed.  On the right side this was essentially normal without filling defects or obstruction.  On the left side he was noted to have a distal left ureteral stone.  A guidewire was passed up the left ureter to the kidney.  Over the guidewire the ureter was dilated progressively to 12 Irish.  Rigid ureteroscopy was performed.  The stone was identified and using the holmium laser was destroyed down the multiple tiny pieces.    Ureteroscope was removed.  Through the cystoscope I placed a 30 cm x 4.8 Irish double-pigtail ureteral stent with retrieval line.  The patient was then sent to the recovery room in satisfactory condition.    Complications none.    Estimated Blood Loss: none    Specimens:  * No orders in the log *      Drains:   Ureteral Drain/Stent Left ureter 4.8 Fr. (Active)   Site Assessment LAUREL  08/03/22 0930   Securement Method Tape 08/03/22 0912         Complications: None      Ryder Sanchez MD     Date: 8/22/2022  Time: 21:29 EDT

## 2022-10-03 ENCOUNTER — OFFICE VISIT (OUTPATIENT)
Dept: SLEEP MEDICINE | Facility: HOSPITAL | Age: 52
End: 2022-10-03

## 2022-10-03 VITALS
SYSTOLIC BLOOD PRESSURE: 117 MMHG | WEIGHT: 199 LBS | HEART RATE: 56 BPM | OXYGEN SATURATION: 97 % | DIASTOLIC BLOOD PRESSURE: 80 MMHG | BODY MASS INDEX: 27.86 KG/M2 | HEIGHT: 71 IN

## 2022-10-03 DIAGNOSIS — G47.33 OBSTRUCTIVE SLEEP APNEA: Primary | ICD-10-CM

## 2022-10-03 DIAGNOSIS — J93.82 AIR LEAK: ICD-10-CM

## 2022-10-03 NOTE — PROGRESS NOTES
"[facility]  Telephone: [phone] / Fax: [phone] [place]  Telephone: [phone] / Fax: [phone] Rachael Castro    PCP: Andres Grayson MD    Reason for visit: MARGOT f/u-Dr Lin's pt    Omar Ha is a 51 y.o.male  was last seen at [facility] sleep lab in Feb 2022. Had HST in April 2022  Got his machine 8/15/22. He now has more facial hair than before, which moves the mask to the side. He uses the full face mask. It fits well.  His daytime alertness has improved. His mask cushion is due for replacement this week.   His bedtime schedule is 11:30pm-7:30am. ESS is 3.    SH- no tobacco, 1-2 alc per week, 0-1 tea/caffeine per day.    ROS- negative.    REBEKAH Edwards's    Current Medications:    Current Outpatient Medications:   •  cholecalciferol (VITAMIN D3) 25 MCG (1000 UT) tablet, Take 1 tablet by mouth Daily., Disp:  , Rfl:   •  lisinopril (PRINIVIL,ZESTRIL) 10 MG tablet, Take 1 tablet by mouth Daily., Disp: 30 tablet, Rfl: 5  •  psyllium (METAMUCIL) 58.6 % packet, Take 1 packet by mouth Daily., Disp: , Rfl:   •  uribel (URO-MP) 118 MG capsule capsule, TAKE 1 CAPSULE BY MOUTH EVERY 6 HOURS AS NEEDED, Disp: , Rfl:    also entered in Sleep Questionnaire    Patient  has a past medical history of Chronic bilateral low back pain without sciatica (12/04/2017), Environmental allergies (12/16/2019), External hemorrhoids (12/16/2019), Gastric polyps (2020), History of colon polyps, Hypertension, Kidney stone on left side (07/2022), Nephrolithiasis (2007), Sleep apnea, and Vitamin D deficiency (12/11/2018).    I have reviewed the Past Medical History, Past Surgical History, Social History and Family History.    Vital Signs /80   Pulse 56   Ht 180.3 cm (71\")   Wt 90.3 kg (199 lb)   SpO2 97%   BMI 27.75 kg/m²  Body mass index is 27.75 kg/m².    General Alert and oriented. No acute distress noted   Pharynx/Throat Class II  Mallampati airway, large tongue, no evidence of redundant lateral " pharyngeal tissue. No oral lesions. No thrush. Moist mucous membranes.   Head Normocephalic. Symmetrical. Atraumatic.    Nose No septal deviation. No drainage   Chest Wall Normal shape. Symmetric expansion with respiration. No tenderness.   Neck Trachea midline, no thyromegaly or adenopathy    Lungs Clear to auscultation bilaterally. No wheezes. No rhonchi. No rales. Respirations regular, even and unlabored.   Heart Regular rhythm and normal rate. Normal S1 and S2. No murmur   Abdomen Soft, non-tender and non-distended. Normal bowel sounds. No masses.   Extremities Moves all extremities well. No edema   Psychiatric Normal mood and affect.     Testing:  Download 8/15/22-9/28/22 44/45 days used with average nightly use of 5 hours and 26 minutes on auto CPAP 6-18cm H2O, AHI 7.4, leak is 36.2 L/min    Study-4/15/22-  Home Sleep Study Findings:   Recording start time 11:02 PM and recording end time 8:16 AM. Total recording time 554.7 minutes and monitoring time 541.2 minutes.   The patient had 136 obstructive events and 51 partial obstructive events. AHI for total sleep time is 20.7.  Supine AHI 45.6.  Lowest oxygen saturation is 76%.  Mean heart rate is 62.7 with a high heart rate 103 and a low heart rate of 52 bpm.  To 15 total snoring episodes noted and percent time snoring 10.4%.    Impression:  1. Obstructive sleep apnea    2. Air leak          Plan:  Patient is doing great on the machine in general. He was asked to continue using the machine nightly to prevent long term complications. Current pressure of 6-18cm H2O appear adequate. P95 is 11.1cm H2O and comfortable. I reviewed original sleep study and day by day download generated from his 3B device. He benefits from the machine use.  Monitor leaks and keep facial hair trimmed.    Call us if there are new problems.    RTC to see Dr Lin in 6 months          Thank you for allowing me to participate in your patient's care.      LUIS Arango  [facility]  Phone: [phone]      Part of this note may be an electronic transcription/translation of spoken language to printed text using the Dragon Dictation System.

## 2022-10-05 ENCOUNTER — TELEPHONE (OUTPATIENT)
Dept: INTERNAL MEDICINE | Facility: CLINIC | Age: 52
End: 2022-10-05

## 2022-10-05 NOTE — TELEPHONE ENCOUNTER
Caller: Omar Ha    Relationship: Self    Best call back number: [phone] (H)    What was the call regarding: BLOOD PRESSURE READINGS     Do you require a callback:  IF NEEDED      READINGS:   117/75 TODAY HEART RATE 61     STAYS IN THAT RANGE MOSTLY (FOR ABOUT A MONTH)     BOTTOM NUMBER FLUCTUATES FROM 75-80    RESTING HEART RATE STAYS IN THE LOW 50S       AT SLEEP STUDY, THEY SAYID BLOOD PRESSURE IS LOW    BUT PATIENT IS ON MEDICATION FOR HIGHER BLOOD PRESSURE

## 2022-10-05 NOTE — TELEPHONE ENCOUNTER
I reviewed sleep med note.  B/P documented as 117/80.  This correlates well with pt home numbers.  Both readings represent well controlled blood pressure numbers.   No mention in note of concern for blood pressure readings.   I am not sure what the issue is. If pt feels well and has consistent numbers in range reported, then looks like we are doing well with good pressure control and would be advised to c/w current plan of care.

## 2022-12-01 ENCOUNTER — AMBULATORY SURGICAL CENTER (OUTPATIENT)
Dept: URBAN - METROPOLITAN AREA SURGERY 20 | Facility: SURGERY | Age: 52
End: 2022-12-01
Payer: COMMERCIAL

## 2022-12-01 ENCOUNTER — OFFICE (OUTPATIENT)
Dept: URBAN - METROPOLITAN AREA PATHOLOGY 4 | Facility: PATHOLOGY | Age: 52
End: 2022-12-01

## 2022-12-01 DIAGNOSIS — K64.0 FIRST DEGREE HEMORRHOIDS: ICD-10-CM

## 2022-12-01 DIAGNOSIS — Z15.09 GENETIC SUSCEPTIBILITY TO OTHER MALIGNANT NEOPLASM: ICD-10-CM

## 2022-12-01 DIAGNOSIS — K31.89 OTHER DISEASES OF STOMACH AND DUODENUM: ICD-10-CM

## 2022-12-01 DIAGNOSIS — K44.9 DIAPHRAGMATIC HERNIA WITHOUT OBSTRUCTION OR GANGRENE: ICD-10-CM

## 2022-12-01 DIAGNOSIS — D12.2 BENIGN NEOPLASM OF ASCENDING COLON: ICD-10-CM

## 2022-12-01 DIAGNOSIS — K63.5 POLYP OF COLON: ICD-10-CM

## 2022-12-01 DIAGNOSIS — Z12.11 ENCOUNTER FOR SCREENING FOR MALIGNANT NEOPLASM OF COLON: ICD-10-CM

## 2022-12-01 LAB
GI HISTOLOGY: A. SELECT: (no result)
GI HISTOLOGY: B. SELECT: (no result)
GI HISTOLOGY: PDF REPORT: (no result)

## 2022-12-01 PROCEDURE — 88305 TISSUE EXAM BY PATHOLOGIST: CPT | Performed by: INTERNAL MEDICINE

## 2022-12-01 PROCEDURE — 45380 COLONOSCOPY AND BIOPSY: CPT | Performed by: INTERNAL MEDICINE

## 2022-12-01 PROCEDURE — 43239 EGD BIOPSY SINGLE/MULTIPLE: CPT | Performed by: INTERNAL MEDICINE

## 2023-01-06 DIAGNOSIS — R73.01 IFG (IMPAIRED FASTING GLUCOSE): ICD-10-CM

## 2023-01-06 DIAGNOSIS — Z12.5 SCREENING FOR PROSTATE CANCER: ICD-10-CM

## 2023-01-06 DIAGNOSIS — I10 PRIMARY HYPERTENSION: Primary | ICD-10-CM

## 2023-01-06 DIAGNOSIS — Z00.00 ANNUAL PHYSICAL EXAM: ICD-10-CM

## 2023-01-10 LAB
ALBUMIN SERPL-MCNC: 4.4 G/DL (ref 3.5–5.2)
ALBUMIN/CREAT UR: 8 MG/G CREAT (ref 0–29)
ALBUMIN/GLOB SERPL: 1.7 G/DL
ALP SERPL-CCNC: 52 U/L (ref 39–117)
ALT SERPL-CCNC: 25 U/L (ref 1–41)
APPEARANCE UR: CLEAR
AST SERPL-CCNC: 19 U/L (ref 1–40)
BACTERIA #/AREA URNS HPF: NORMAL /HPF
BASOPHILS # BLD AUTO: 0.04 10*3/MM3 (ref 0–0.2)
BASOPHILS NFR BLD AUTO: 0.6 % (ref 0–1.5)
BILIRUB SERPL-MCNC: 0.5 MG/DL (ref 0–1.2)
BILIRUB UR QL STRIP: NEGATIVE
BUN SERPL-MCNC: 20 MG/DL (ref 6–20)
BUN/CREAT SERPL: 16.8 (ref 7–25)
CALCIUM SERPL-MCNC: 9.9 MG/DL (ref 8.6–10.5)
CASTS URNS QL MICRO: NORMAL /LPF
CHLORIDE SERPL-SCNC: 103 MMOL/L (ref 98–107)
CHOLEST SERPL-MCNC: 220 MG/DL (ref 0–200)
CO2 SERPL-SCNC: 31.7 MMOL/L (ref 22–29)
COLOR UR: YELLOW
CREAT SERPL-MCNC: 1.19 MG/DL (ref 0.76–1.27)
CREAT UR-MCNC: 114.4 MG/DL
EGFRCR SERPLBLD CKD-EPI 2021: 72.6 ML/MIN/1.73
EOSINOPHIL # BLD AUTO: 0.18 10*3/MM3 (ref 0–0.4)
EOSINOPHIL NFR BLD AUTO: 2.6 % (ref 0.3–6.2)
EPI CELLS #/AREA URNS HPF: NORMAL /HPF (ref 0–10)
ERYTHROCYTE [DISTWIDTH] IN BLOOD BY AUTOMATED COUNT: 12.6 % (ref 12.3–15.4)
GLOBULIN SER CALC-MCNC: 2.6 GM/DL
GLUCOSE SERPL-MCNC: 109 MG/DL (ref 65–99)
GLUCOSE UR QL STRIP: NEGATIVE
HBA1C MFR BLD: 5.4 % (ref 4.8–5.6)
HCT VFR BLD AUTO: 45.1 % (ref 37.5–51)
HDLC SERPL-MCNC: 54 MG/DL (ref 40–60)
HGB BLD-MCNC: 15.6 G/DL (ref 13–17.7)
HGB UR QL STRIP: NEGATIVE
IMM GRANULOCYTES # BLD AUTO: 0.03 10*3/MM3 (ref 0–0.05)
IMM GRANULOCYTES NFR BLD AUTO: 0.4 % (ref 0–0.5)
KETONES UR QL STRIP: NEGATIVE
LDLC SERPL CALC-MCNC: 140 MG/DL (ref 0–100)
LEUKOCYTE ESTERASE UR QL STRIP: NEGATIVE
LYMPHOCYTES # BLD AUTO: 1.47 10*3/MM3 (ref 0.7–3.1)
LYMPHOCYTES NFR BLD AUTO: 21.6 % (ref 19.6–45.3)
MCH RBC QN AUTO: 30.3 PG (ref 26.6–33)
MCHC RBC AUTO-ENTMCNC: 34.6 G/DL (ref 31.5–35.7)
MCV RBC AUTO: 87.6 FL (ref 79–97)
MICRO URNS: NORMAL
MICRO URNS: NORMAL
MICROALBUMIN UR-MCNC: 9.1 UG/ML
MONOCYTES # BLD AUTO: 0.58 10*3/MM3 (ref 0.1–0.9)
MONOCYTES NFR BLD AUTO: 8.5 % (ref 5–12)
NEUTROPHILS # BLD AUTO: 4.52 10*3/MM3 (ref 1.7–7)
NEUTROPHILS NFR BLD AUTO: 66.3 % (ref 42.7–76)
NITRITE UR QL STRIP: NEGATIVE
NRBC BLD AUTO-RTO: 0 /100 WBC (ref 0–0.2)
PH UR STRIP: 5.5 [PH] (ref 5–7.5)
PLATELET # BLD AUTO: 214 10*3/MM3 (ref 140–450)
POTASSIUM SERPL-SCNC: 4.8 MMOL/L (ref 3.5–5.2)
PROT SERPL-MCNC: 7 G/DL (ref 6–8.5)
PROT UR QL STRIP: NEGATIVE
PSA SERPL-MCNC: 2.75 NG/ML (ref 0–4)
RBC # BLD AUTO: 5.15 10*6/MM3 (ref 4.14–5.8)
RBC #/AREA URNS HPF: NORMAL /HPF (ref 0–2)
SODIUM SERPL-SCNC: 142 MMOL/L (ref 136–145)
SP GR UR STRIP: 1.02 (ref 1–1.03)
TRIGL SERPL-MCNC: 144 MG/DL (ref 0–150)
TSH SERPL DL<=0.005 MIU/L-ACNC: 1.79 UIU/ML (ref 0.27–4.2)
URINALYSIS REFLEX: NORMAL
UROBILINOGEN UR STRIP-MCNC: 0.2 MG/DL (ref 0.2–1)
VLDLC SERPL CALC-MCNC: 26 MG/DL (ref 5–40)
WBC # BLD AUTO: 6.82 10*3/MM3 (ref 3.4–10.8)
WBC #/AREA URNS HPF: NORMAL /HPF (ref 0–5)

## 2023-01-16 ENCOUNTER — OFFICE VISIT (OUTPATIENT)
Dept: INTERNAL MEDICINE | Facility: CLINIC | Age: 53
End: 2023-01-16
Payer: COMMERCIAL

## 2023-01-16 VITALS
OXYGEN SATURATION: 99 % | SYSTOLIC BLOOD PRESSURE: 126 MMHG | BODY MASS INDEX: 28.5 KG/M2 | WEIGHT: 203.6 LBS | HEIGHT: 71 IN | DIASTOLIC BLOOD PRESSURE: 82 MMHG | TEMPERATURE: 98.5 F | HEART RATE: 76 BPM

## 2023-01-16 DIAGNOSIS — G47.33 OSA (OBSTRUCTIVE SLEEP APNEA): ICD-10-CM

## 2023-01-16 DIAGNOSIS — R73.01 IFG (IMPAIRED FASTING GLUCOSE): ICD-10-CM

## 2023-01-16 DIAGNOSIS — K76.0 FATTY LIVER: ICD-10-CM

## 2023-01-16 DIAGNOSIS — M10.9 PODAGRA: ICD-10-CM

## 2023-01-16 DIAGNOSIS — M50.30 DDD (DEGENERATIVE DISC DISEASE), CERVICAL: ICD-10-CM

## 2023-01-16 DIAGNOSIS — E55.9 VITAMIN D DEFICIENCY: ICD-10-CM

## 2023-01-16 DIAGNOSIS — N20.1 LEFT URETERAL STONE: ICD-10-CM

## 2023-01-16 DIAGNOSIS — K64.4 EXTERNAL HEMORRHOIDS: ICD-10-CM

## 2023-01-16 DIAGNOSIS — Z00.01 ENCOUNTER FOR GENERAL ADULT MEDICAL EXAMINATION WITH ABNORMAL FINDINGS: Primary | ICD-10-CM

## 2023-01-16 DIAGNOSIS — Z15.09 LYNCH SYNDROME: ICD-10-CM

## 2023-01-16 DIAGNOSIS — I10 PRIMARY HYPERTENSION: ICD-10-CM

## 2023-01-16 DIAGNOSIS — Z91.09 ENVIRONMENTAL ALLERGIES: ICD-10-CM

## 2023-01-16 PROCEDURE — 99396 PREV VISIT EST AGE 40-64: CPT | Performed by: INTERNAL MEDICINE

## 2023-01-16 NOTE — PROGRESS NOTES
"Annual Exam (CPE. Review of Medical Issues)      HPI  Omar Ha is a 52 y.o. male RTC In yearly CPE, review of medical issues:   1. Recurrent nephrolithiasis on watchful waiting course with urology last ~6 months - had CYSTOSCOPY, BILATERAL RETROGRADE PYELOGRAM, LEFT URETEROSCOPY LASER LITHOTRIPSY, STENT PLACEMENT 8/3/22.  \"That was a horrible experience'.  Really does not want to have more scans if can avoid. Has 2 remnant stones and is watching them. Will see urology in 2/2023.    2.  Persistent blood pressure elevation in office at urology  - has been checking at home since and getting 120's/ 80-90 range. Has arm cuff at home.  Has been working on weight loss and IF routine. Recalls machine overread here in office in past.  Is on lisinopril 10mg daily.   3. MARGOT  - new dx in 2022. Is on CPAP and notes 'I like the CPAP'.  Has seen sleep med and had hourle events 20--> 3 on tx. Feeling better and has 'more energy'.     and pt due for CPAP delivery on Monday next week.  D/W pt likely strong contribution of this issue to elevated and variable blood pressure numbers.   4. L great toe MTP pain, c/w gout - no recurrent issues.    5. 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 Yen, intestinal metaplasia without dysplasia noted in stomach, no polyps.  Repeat EGD/ C-scope in 12/2022.  Had one polyp removed and will be on 2 year schedule now.  Cysto OK with urology recently.   6. Vitamin D deficiency -  on 1000 I.U. Vitamin D3 OTC daily.   7. Environmental Alleriges - Claritin PRN.  Really no issues.   8. Fatty liver/ ALT elevation/ IFG ; US showed fatty liver  - has been doing IF and notes 'which I like'.  Has but out breakfast overall now with IF. Rest of diet has not changed and trying to get more fiber with supplement. Weight has been stable despite. Exercise is 'good'.  Plays 4 hours of pickleball weekly and will et tpo gym " 2/x week otherwise.   9. External hemorrhoids - RACIEL. On fiber daily.    Review of Systems   Constitutional: Positive for weight loss (a few pounds over the year, intentional). Negative for chills and fever.   HENT: Negative for congestion, hearing loss and sore throat.    Eyes: Negative for discharge, double vision, pain and redness.        Last eye exam ~12/2022; wears readers     Cardiovascular: Negative for chest pain, dyspnea on exertion, irregular heartbeat, leg swelling, near-syncope and palpitations.   Respiratory: Negative for cough (resolved after cold a few weeks ago) and shortness of breath.    Endocrine: Negative for polydipsia, polyphagia and polyuria.   Hematologic/Lymphatic: Negative for bleeding problem. Does not bruise/bleed easily.   Skin: Negative for rash and suspicious lesions.        Sees derm annually.  they check me thoroughly'     Musculoskeletal: Negative for neck pain (has some crepitus in neck with head turning) and stiffness.   Gastrointestinal: Negative for constipation, diarrhea, heartburn, nausea and vomiting.   Genitourinary: Negative for dysuria, frequency, hematuria, hesitancy and incomplete emptying.   Neurological: Negative for focal weakness, numbness, paresthesias and vertigo.   Psychiatric/Behavioral: Negative for depression. The patient is not nervous/anxious.    Allergic/Immunologic: Positive for environmental allergies. Negative for persistent infections.       Problem List:    Patient Active Problem List   Diagnosis   • Family hx of colon cancer   • Lindquist syndrome   • Vitamin D deficiency   • Environmental allergies   • External hemorrhoids   • IFG (impaired fasting glucose)   • DDD (degenerative disc disease), cervical   • Fatty liver   • Left ureteral stone   • MARGOT (obstructive sleep apnea)   • Primary hypertension   • Podagra       Medical History:    Past Medical History:   Diagnosis Date   • Allergic Possibly penicillin   • Cholelithiasis 2/2022    small stone  detected   • Chronic bilateral low back pain without sciatica 12/04/2017   • Colon polyp     a few polyps have been removed from my colon and stomach   • Environmental allergies 12/16/2019   • External hemorrhoids 12/16/2019    Hx bleeding in 2019   • Gastric polyps 2020   • History of colon polyps    • Hypertension    • Kidney stone on left side 07/2022   • Nephrolithiasis 2007    unknown type of stone   • Sleep apnea     WAITING FOR C-PAP   • Vitamin D deficiency 12/11/2018        Social History:    Social History     Socioeconomic History   • Marital status:    • Number of children: 3   Tobacco Use   • Smoking status: Never   • Smokeless tobacco: Never   Vaping Use   • Vaping Use: Never used   Substance and Sexual Activity   • Alcohol use: Yes     Alcohol/week: 2.0 standard drinks     Types: 2 Cans of beer per week     Comment: 2-4 drinks/ month   • Drug use: No   • Sexual activity: Yes     Partners: Female     Birth control/protection: Vasectomy     Comment: wife only; no hx STD's       Family History:   Family History   Problem Relation Age of Onset   • Mitral valve prolapse Mother    • Uterine cancer Mother    • Colon cancer Mother         Lindquist Syndrome   • Hypertension Mother    • Hypothyroidism Mother    • Melanoma Mother    • Squamous cell carcinoma Mother    • Arthritis Mother    • Cancer Mother         Many different types of cancer   • Leukemia Father         CLL   • Skin cancer Father    • Bell's palsy Father    • Hypertension Father    • Hyperlipidemia Father    • Heart failure Father    • Heart disease Father         CABG x 4   • Diabetes Father    • Skin cancer Sister    • Colon cancer Brother         Lindquist Syndrome   • No Known Problems Daughter    • No Known Problems Daughter    • No Known Problems Son    • Malig Hyperthermia Neg Hx        Surgical History:   Past Surgical History:   Procedure Laterality Date   • COLONOSCOPY  2020   • COLONOSCOPY  12/01/2022   • ENDOSCOPY  2020   •  ENDOSCOPY  12/01/2022   • FRACTURE SURGERY  1986    Broken knee   • PATELLA FRACTURE SURGERY Right 1986   • URETEROSCOPY LASER LITHOTRIPSY WITH STENT INSERTION Left 08/03/2022    Procedure: CYSTOSCOPY, BILATERAL RETROGRADE PYELOGRAM, LEFT URETEROSCOPY LASER LITHOTRIPSY, STENT PLACEMENT;  Surgeon: Ryder Sanchez MD;  Location: [facility]  Service: Urology;  Laterality: Left;         Current Outpatient Medications:   •  cholecalciferol (VITAMIN D3) 25 MCG (1000 UT) tablet, Take 1 tablet by mouth Daily., Disp:  , Rfl:   •  lisinopril (PRINIVIL,ZESTRIL) 10 MG tablet, Take 1 tablet by mouth Daily., Disp: 30 tablet, Rfl: 5  •  NON FORMULARY, Take 1 each by mouth Daily. Beet juice, Disp: , Rfl:   •  NON FORMULARY, Take 1 each by mouth Daily. Tart cherry, Disp: , Rfl:   •  psyllium (METAMUCIL) 58.6 % packet, Take 1 packet by mouth Daily., Disp: , Rfl:     Vitals:    01/16/23 1009   BP: 126/82   Pulse: 76   Temp: 98.5 °F (36.9 °C)   SpO2: 99%     Body mass index is 28.4 kg/m².    Physical Exam  Vitals reviewed.   Constitutional:       General: He is not in acute distress.     Appearance: Normal appearance. He is well-developed. He is not ill-appearing or toxic-appearing.   HENT:      Head: Normocephalic and atraumatic.      Right Ear: Hearing, tympanic membrane, ear canal and external ear normal. There is no impacted cerumen.      Left Ear: Hearing, tympanic membrane, ear canal and external ear normal. There is no impacted cerumen.      Nose: Nose normal.      Mouth/Throat:      Mouth: Mucous membranes are moist. No oral lesions.      Tongue: No lesions.      Pharynx: Oropharynx is clear. Uvula midline. No pharyngeal swelling, oropharyngeal exudate, posterior oropharyngeal erythema or uvula swelling.   Eyes:      General: Lids are normal. No scleral icterus.        Right eye: No discharge.         Left eye: No discharge.      Extraocular Movements: Extraocular movements intact.      Conjunctiva/sclera:  Conjunctivae normal.      Pupils: Pupils are equal, round, and reactive to light.   Neck:      Thyroid: No thyroid mass or thyromegaly.      Vascular: No carotid bruit.   Cardiovascular:      Rate and Rhythm: Normal rate and regular rhythm.      Pulses:           Radial pulses are 2+ on the right side and 2+ on the left side.        Dorsalis pedis pulses are 2+ on the right side and 2+ on the left side.        Posterior tibial pulses are 2+ on the right side and 2+ on the left side.      Heart sounds: Normal heart sounds, S1 normal and S2 normal. No murmur heard.    No friction rub. No gallop.   Pulmonary:      Effort: Pulmonary effort is normal. No respiratory distress.      Breath sounds: Normal breath sounds. No wheezing, rhonchi or rales.   Abdominal:      General: Bowel sounds are normal. There is no distension.      Palpations: Abdomen is soft. There is no mass.      Tenderness: There is no abdominal tenderness. There is no guarding or rebound.   Genitourinary:     Prostate: Normal. Not enlarged and not tender.      Rectum: Normal. No external hemorrhoid. Normal anal tone.   Musculoskeletal:         General: No deformity.      Right shoulder: No tenderness or bony tenderness. Normal range of motion.      Left shoulder: No tenderness or bony tenderness. Normal range of motion.      Right hand: No tenderness or bony tenderness. Normal range of motion. Normal strength.      Left hand: No tenderness or bony tenderness. Normal range of motion. Normal strength.      Cervical back: Neck supple. Crepitus (subjective) present. No rigidity. No pain with movement, spinous process tenderness or muscular tenderness. Decreased range of motion (slight loss ROM B rotation).      Right lower leg: No edema.      Left lower leg: No edema.   Lymphadenopathy:      Cervical: No cervical adenopathy.      Right cervical: No superficial, deep or posterior cervical adenopathy.     Left cervical: No superficial, deep or posterior  cervical adenopathy.      Upper Body:      Right upper body: No supraclavicular, axillary or pectoral adenopathy.      Left upper body: No supraclavicular, axillary or pectoral adenopathy.   Skin:     General: Skin is warm and dry.      Findings: No rash.   Neurological:      Mental Status: He is alert and oriented to person, place, and time.      Cranial Nerves: No cranial nerve deficit.      Sensory: No sensory deficit.      Motor: No weakness, tremor, atrophy or abnormal muscle tone.      Gait: Gait normal.      Deep Tendon Reflexes: Reflexes are normal and symmetric.      Reflex Scores:       Patellar reflexes are 2+ on the right side and 2+ on the left side.       Achilles reflexes are 2+ on the right side and 2+ on the left side.  Psychiatric:         Attention and Perception: Attention normal.         Mood and Affect: Mood normal.         Speech: Speech normal.         Behavior: Behavior normal. Behavior is cooperative.         Thought Content: Thought content normal.         Assessment/ Plan  Diagnoses and all orders for this visit:    Encounter for general adult medical examination with abnormal findings    Vitamin D deficiency    Primary hypertension    MARGOT (obstructive sleep apnea)    Lindquist syndrome    Left ureteral stone    IFG (impaired fasting glucose)    Fatty liver    External hemorrhoids    Environmental allergies    DDD (degenerative disc disease), cervical    Podagra    Other orders  -     NON FORMULARY; Take 1 each by mouth Daily. Beet juice  -     NON FORMULARY; Take 1 each by mouth Daily. Tart cherry        Return in about 6 months (around 7/16/2023) for Recheck.      Discussion:  Omar Ha is a 52 y.o. male RTC In yearly CPE, review of medical issues:  1. Recurrent nephrolithiasis - s/p CYSTOSCOPY, BILATERAL RETROGRADE PYELOGRAM, LEFT URETEROSCOPY LASER LITHOTRIPSY, STENT PLACEMENT 8/3/22.   Two remnant stones and is watching them. Will see urology in 2/2023, plans to inquire about image  sparing approach. U/A clear on labs.     2.  HTN - dx'd after persistent blood pressure elevation in office at urology. Controlled on lisinopril 10mg daily. BMP OK. C/W same.   3. MARGOT  - new dx in 2022. Compliant on CPAP. F/U sleep med as planned.  Consider Prevnar 20 at f/u.  4. Hx of L great toe MTP pain c/w gout - resolved with Colchicine, prior visit. No recurrence. Uric acid mild elevation but pt has made TLC mods and no recurrence of sx, hold on allopurinol addition.    5. 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 Yen, intestinal metaplasia without dysplasia noted in stomach, no polyps.  Repeat EGD/ C-scope in 12/2022, one polyp removed --> 12/2024.   Cysto OK with urology recently.   6. Vitamin D deficiency - on 1000 I.U. Vitamin D3 OTC daily.   7. Environmental Alleriges - Claritin PRN.    8. Fatty liver, US shown/ ALT elevation/ IFG  - weight stable despite some IF efforts.  A1C normal.  C/W MARGOT tx and weight loss efforts; good exercise routine.   9. External hemorrhoids - RACIEL. Is on fiber daily.  10. HM - labs d/w pt; Tdap/ Hep A/ COVID - UTD; Flu/ COVID booster at pharmacy; Shingrix -at pharmacy; Prevnar 20 at f/u;  C-scope/ EGD 12/2022 (1 polyp SSA) --> 2 years; BARON/ PSA OK today; Hep C Ab (-) 12/2020; exercise for weight loss.    RTC 6 months, F labs prior (CMP, A1C, Vit D)    RTC on

## 2023-02-13 RX ORDER — LISINOPRIL 10 MG/1
10 TABLET ORAL DAILY
Qty: 30 TABLET | Refills: 5 | Status: SHIPPED | OUTPATIENT
Start: 2023-02-13

## 2023-05-31 ENCOUNTER — TELEPHONE (OUTPATIENT)
Dept: SLEEP MEDICINE | Facility: HOSPITAL | Age: 53
End: 2023-05-31

## 2023-05-31 ENCOUNTER — OFFICE VISIT (OUTPATIENT)
Dept: SLEEP MEDICINE | Facility: HOSPITAL | Age: 53
End: 2023-05-31
Payer: COMMERCIAL

## 2023-05-31 VITALS — OXYGEN SATURATION: 96 % | HEIGHT: 71 IN | HEART RATE: 91 BPM | BODY MASS INDEX: 28.98 KG/M2 | WEIGHT: 207 LBS

## 2023-05-31 DIAGNOSIS — G47.33 OSA ON CPAP: Primary | ICD-10-CM

## 2023-05-31 DIAGNOSIS — Z99.89 OSA ON CPAP: Primary | ICD-10-CM

## 2023-05-31 DIAGNOSIS — G47.36 HYPOXEMIA ASSOCIATED WITH SLEEP: ICD-10-CM

## 2023-05-31 PROCEDURE — G0463 HOSPITAL OUTPT CLINIC VISIT: HCPCS

## 2023-05-31 NOTE — PROGRESS NOTES
[facility] New Patient[facility]Consultation       Reason for Consultation: MARGOT    Patient Care Team:  Andres Grayson MD as PCP - General (Internal Medicine)  Boston Olivo MD as Consulting Physician (Sleep Medicine)    Chief complaint: MARGOT    History of present illness:    Thank you for asking me to see your patient.  The patient is a 52 y.o. male who was [facility] 10/3/2022.  Home sleep study performed 4/14/2022.  Weight 209 pounds.  Moderate MARGOT with AHI 20.7 events per hour noted.  When supine, severe MARGOT with AHI 46.6 events per hour.  On his right side for 350.5 minutes, AHI mildly abnormal at 7.2 events per hour.  Low oxygen saturation 76% and sleep-related hypoxia present for 24.3 minutes.  The patient snored 10.4% of total monitoring time.      Auto CPAP initiated and the patient is here today for follow-up.  The patient reports he is improved.  He has more energy.  He goes to bed between 11:30 PM and midnight and gets out of bed between 7 and 7:30 AM.  He will use the restroom during that time.    Review of Systems:    A complete review of systems was done and all were negative with the exception of the above    History:  Past Medical History:   Diagnosis Date   • Allergic Possibly penicillin   • Cholelithiasis 2/2022    small stone detected   • Chronic bilateral low back pain without sciatica 12/04/2017   • Colon polyp     a few polyps have been removed from my colon and stomach   • Environmental allergies 12/16/2019   • External hemorrhoids 12/16/2019    Hx bleeding in 2019   • Gastric polyps 2020   • History of colon polyps    • Hypertension    • Kidney stone on left side 07/2022   • Nephrolithiasis 2007    unknown type of stone   • MAGROT on auto CPAP 04/14/2022    Home sleep study.  Weight 209 pounds.  Moderate MARGOT with AHI 20.7 events per hour.  When supine, severe at 45.6 events per hour.  On his right side, mild at 7.2 events per hour.  Low O2 saturation 75% And  sleep-related hypoxia present for 24.3 minutes.   • Vitamin D deficiency 12/11/2018   ,   Past Surgical History:   Procedure Laterality Date   • COLONOSCOPY  2020   • COLONOSCOPY  12/01/2022   • ENDOSCOPY  2020   • ENDOSCOPY  12/01/2022   • FRACTURE SURGERY  1986    Broken knee   • PATELLA FRACTURE SURGERY Right 1986   • URETEROSCOPY LASER LITHOTRIPSY WITH STENT INSERTION Left 08/03/2022    Procedure: CYSTOSCOPY, BILATERAL RETROGRADE PYELOGRAM, LEFT URETEROSCOPY LASER LITHOTRIPSY, STENT PLACEMENT;  Surgeon: Ryder Sanchez MD;  Location: [facility] OR;  Service: Urology;  Laterality: Left;   ,   Family History   Problem Relation Age of Onset   • Mitral valve prolapse Mother    • Uterine cancer Mother    • Colon cancer Mother         Lindquist Syndrome   • Hypertension Mother    • Hypothyroidism Mother    • Melanoma Mother    • Squamous cell carcinoma Mother    • Arthritis Mother    • Cancer Mother         Many different types of cancer   • Leukemia Father         CLL   • Skin cancer Father    • Bell's palsy Father    • Hypertension Father    • Hyperlipidemia Father    • Heart failure Father    • Heart disease Father         CABG x 4   • Diabetes Father    • Skin cancer Sister    • Colon cancer Brother         Lindquist Syndrome   • No Known Problems Daughter    • No Known Problems Daughter    • No Known Problems Son    • Malig Hyperthermia Neg Hx     and   Social History     Socioeconomic History   • Marital status:    • Number of children: 3   Tobacco Use   • Smoking status: Never   • Smokeless tobacco: Never   Vaping Use   • Vaping Use: Never used   Substance and Sexual Activity   • Alcohol use: Yes     Alcohol/week: 2.0 standard drinks     Types: 2 Cans of beer per week     Comment: 2-4 drinks/ month   • Drug use: No   • Sexual activity: Yes     Partners: Female     Birth control/protection: Vasectomy     Comment: wife only; no hx STD's     E-cigarette/Vaping   • E-cigarette/Vaping Use Never User    •  "Passive Exposure No    • Counseling Given No      E-cigarette/Vaping Substances   • Nicotine No    • THC No    • CBD No    • Flavoring No      E-cigarette/Vaping Devices   • Disposable No    • Pre-filled or Refillable Cartridge No    • Refillable Tank No    • Pre-filled Pod No         Social History: 2-4 cups of tea a day.    Allergies:  Morphine     Medication Review: Lisinopril    Vital Signs:    Vitals:    05/31/23 1118   Pulse: 91   SpO2: 96%   Weight: 93.9 kg (207 lb)   Height: 180.3 cm (71\")      Body mass index is 28.87 kg/m².         Physical Exam:    Constitutional:  Well developed 52 y.o. male that appears in no apparent distress.  Awake & oriented times 3.  Normal mood with normal recent and remote memory and normal judgement.  Eyes:  Conjunctivae normal.  Oropharynx: Moist mucous membranes without exudate and a large tongue and class II Mallampati airway.    Neck: Trachea midline  Respiratory: Effort is not labored  Cardiovascular: Radial pulse regular  Musculoskeletal: Gait appears normal, no digital clubbing evident, no pre-tibial edema    Results Review: DME is [facility] and he uses a fullface mask.  Downloads between 5/2 and 5/31/2023 compliance 90% and average usage 5 hours and 44 minutes.  Average AHI is normal and average auto CPAP pressure is 9.7 and his 3B [employer] auto CPAP is set at 6-18.    Impression:   Moderate severity obstructive sleep apnea, severe when supine, with sleep-related hypoxia by home sleep study 4/14/2022, weight 209 pounds, adequately treated with 3[employer] auto CPAP and he is at goal with good compliance and usage and no complaints of hypersomnolence.    Plan:  Good sleep hygiene measures should be maintained.  Some weight loss would be beneficial in this patient who is overweight by Body mass index is 28.87 kg/m²..    After evaluating the patient and assessing results available, the patient is benefiting from the treatment being provided.     Pathophysiology of MARGOT briefly " described to the patient.  Cardiovascular complications of untreated MARGOT also briefly reviewed.      After reviewing all with the patient, the patient's obstructive sleep apnea is adequately treated with auto titrating CPAP.  However, the patient states at times he could use more CPAP pressure.  Therefore, auto CPAP will be changed to 8-14 cm water pressure.  A new prescription will be sent to his DME.  I answered all of his questions.  I will see the patient back in 1 year.    Thank you for requesting me to assist in this patient's care.      Boston Olivo MD  Sleep Medicine  05/31/23  11:35 EDT

## 2023-06-10 PROBLEM — Z99.89 OSA ON CPAP: Status: ACTIVE | Noted: 2022-04-14

## 2023-06-10 PROBLEM — G47.36 HYPOXEMIA ASSOCIATED WITH SLEEP: Status: ACTIVE | Noted: 2023-06-10

## 2023-06-10 PROBLEM — G47.33 OSA ON CPAP: Status: ACTIVE | Noted: 2022-04-14

## 2023-09-05 ENCOUNTER — TELEPHONE (OUTPATIENT)
Dept: INTERNAL MEDICINE | Facility: CLINIC | Age: 53
End: 2023-09-05

## 2023-09-05 NOTE — TELEPHONE ENCOUNTER
Caller: Omar Ha    Relationship to patient: Self    Best call back number: [phone]    Patient is needing: PATIENT CALLED AND SAID HIS FATHER IN LAW IS MOVING IN FROM ANOTHER STATE, AND THE PATIENT IS WANTING TO KNOW IF DR. OBANDO WOULD BE WILLING TO ACCEPT HIM AS A NEW PATIENT AND DO A PHYSICAL FOR HIM    REQUESTED CALLBACK

## 2023-11-02 RX ORDER — LISINOPRIL 10 MG/1
10 TABLET ORAL DAILY
Qty: 30 TABLET | Refills: 5 | Status: SHIPPED | OUTPATIENT
Start: 2023-11-02

## 2024-01-04 ENCOUNTER — TELEPHONE (OUTPATIENT)
Dept: SLEEP MEDICINE | Facility: HOSPITAL | Age: 54
End: 2024-01-04
Payer: COMMERCIAL

## 2024-01-04 NOTE — TELEPHONE ENCOUNTER
Pt called stating he is going on vacation and wanted to know about altitude adjustments to CPAP. Pt on Traci device . Spoke with DME , Traci does have an altitude adjustment the DME can do without script . LV informing pt to call DME for request

## 2024-01-11 DIAGNOSIS — R73.01 IFG (IMPAIRED FASTING GLUCOSE): ICD-10-CM

## 2024-01-11 DIAGNOSIS — Z12.5 SCREENING FOR PROSTATE CANCER: ICD-10-CM

## 2024-01-11 DIAGNOSIS — I10 PRIMARY HYPERTENSION: ICD-10-CM

## 2024-01-11 DIAGNOSIS — Z00.00 HEALTH MAINTENANCE EXAMINATION: Primary | ICD-10-CM

## 2024-01-16 LAB
ALBUMIN SERPL-MCNC: 4.7 G/DL (ref 3.5–5.2)
ALBUMIN/CREAT UR: <3 MG/G CREAT (ref 0–29)
ALBUMIN/GLOB SERPL: 2 G/DL
ALP SERPL-CCNC: 57 U/L (ref 39–117)
ALT SERPL-CCNC: 32 U/L (ref 1–41)
APPEARANCE UR: CLEAR
AST SERPL-CCNC: 22 U/L (ref 1–40)
BACTERIA #/AREA URNS HPF: NORMAL /HPF
BASOPHILS # BLD AUTO: 0.03 10*3/MM3 (ref 0–0.2)
BASOPHILS NFR BLD AUTO: 0.6 % (ref 0–1.5)
BILIRUB SERPL-MCNC: 0.5 MG/DL (ref 0–1.2)
BILIRUB UR QL STRIP: NEGATIVE
BUN SERPL-MCNC: 18 MG/DL (ref 6–20)
BUN/CREAT SERPL: 15.1 (ref 7–25)
CALCIUM SERPL-MCNC: 9.8 MG/DL (ref 8.6–10.5)
CASTS URNS QL MICRO: NORMAL /LPF
CHLORIDE SERPL-SCNC: 105 MMOL/L (ref 98–107)
CHOLEST SERPL-MCNC: 222 MG/DL (ref 0–200)
CO2 SERPL-SCNC: 27.8 MMOL/L (ref 22–29)
COLOR UR: YELLOW
CREAT SERPL-MCNC: 1.19 MG/DL (ref 0.76–1.27)
CREAT UR-MCNC: 105.7 MG/DL
EGFRCR SERPLBLD CKD-EPI 2021: 72.1 ML/MIN/1.73
EOSINOPHIL # BLD AUTO: 0.18 10*3/MM3 (ref 0–0.4)
EOSINOPHIL NFR BLD AUTO: 3.7 % (ref 0.3–6.2)
EPI CELLS #/AREA URNS HPF: NORMAL /HPF (ref 0–10)
ERYTHROCYTE [DISTWIDTH] IN BLOOD BY AUTOMATED COUNT: 13 % (ref 12.3–15.4)
GLOBULIN SER CALC-MCNC: 2.3 GM/DL
GLUCOSE SERPL-MCNC: 111 MG/DL (ref 65–99)
GLUCOSE UR QL STRIP: NEGATIVE
HBA1C MFR BLD: 5.5 % (ref 4.8–5.6)
HCT VFR BLD AUTO: 46.5 % (ref 37.5–51)
HDLC SERPL-MCNC: 50 MG/DL (ref 40–60)
HGB BLD-MCNC: 16.2 G/DL (ref 13–17.7)
HGB UR QL STRIP: NEGATIVE
IMM GRANULOCYTES # BLD AUTO: 0.02 10*3/MM3 (ref 0–0.05)
IMM GRANULOCYTES NFR BLD AUTO: 0.4 % (ref 0–0.5)
KETONES UR QL STRIP: NEGATIVE
LDLC SERPL CALC-MCNC: 149 MG/DL (ref 0–100)
LEUKOCYTE ESTERASE UR QL STRIP: NEGATIVE
LYMPHOCYTES # BLD AUTO: 1.35 10*3/MM3 (ref 0.7–3.1)
LYMPHOCYTES NFR BLD AUTO: 28 % (ref 19.6–45.3)
MCH RBC QN AUTO: 30.5 PG (ref 26.6–33)
MCHC RBC AUTO-ENTMCNC: 34.8 G/DL (ref 31.5–35.7)
MCV RBC AUTO: 87.4 FL (ref 79–97)
MICRO URNS: NORMAL
MICRO URNS: NORMAL
MICROALBUMIN UR-MCNC: <3 UG/ML
MONOCYTES # BLD AUTO: 0.5 10*3/MM3 (ref 0.1–0.9)
MONOCYTES NFR BLD AUTO: 10.4 % (ref 5–12)
NEUTROPHILS # BLD AUTO: 2.75 10*3/MM3 (ref 1.7–7)
NEUTROPHILS NFR BLD AUTO: 56.9 % (ref 42.7–76)
NITRITE UR QL STRIP: NEGATIVE
NRBC BLD AUTO-RTO: 0 /100 WBC (ref 0–0.2)
PH UR STRIP: 5.5 [PH] (ref 5–7.5)
PLATELET # BLD AUTO: 269 10*3/MM3 (ref 140–450)
POTASSIUM SERPL-SCNC: 4.7 MMOL/L (ref 3.5–5.2)
PROT SERPL-MCNC: 7 G/DL (ref 6–8.5)
PROT UR QL STRIP: NEGATIVE
PSA SERPL-MCNC: 2.72 NG/ML (ref 0–4)
RBC # BLD AUTO: 5.32 10*6/MM3 (ref 4.14–5.8)
RBC #/AREA URNS HPF: NORMAL /HPF (ref 0–2)
SODIUM SERPL-SCNC: 144 MMOL/L (ref 136–145)
SP GR UR STRIP: 1.02 (ref 1–1.03)
TRIGL SERPL-MCNC: 129 MG/DL (ref 0–150)
TSH SERPL DL<=0.005 MIU/L-ACNC: 1.56 UIU/ML (ref 0.27–4.2)
URINALYSIS REFLEX: NORMAL
UROBILINOGEN UR STRIP-MCNC: 0.2 MG/DL (ref 0.2–1)
VLDLC SERPL CALC-MCNC: 23 MG/DL (ref 5–40)
WBC # BLD AUTO: 4.83 10*3/MM3 (ref 3.4–10.8)
WBC #/AREA URNS HPF: NORMAL /HPF (ref 0–5)

## 2024-01-18 ENCOUNTER — OFFICE VISIT (OUTPATIENT)
Dept: INTERNAL MEDICINE | Facility: CLINIC | Age: 54
End: 2024-01-18
Payer: COMMERCIAL

## 2024-01-18 VITALS
DIASTOLIC BLOOD PRESSURE: 100 MMHG | BODY MASS INDEX: 29.68 KG/M2 | TEMPERATURE: 97.5 F | WEIGHT: 212 LBS | HEART RATE: 67 BPM | SYSTOLIC BLOOD PRESSURE: 164 MMHG | HEIGHT: 71 IN | OXYGEN SATURATION: 98 %

## 2024-01-18 DIAGNOSIS — G47.33 OSA ON CPAP: ICD-10-CM

## 2024-01-18 DIAGNOSIS — I10 PRIMARY HYPERTENSION: ICD-10-CM

## 2024-01-18 DIAGNOSIS — K64.4 EXTERNAL HEMORRHOIDS: ICD-10-CM

## 2024-01-18 DIAGNOSIS — K76.0 FATTY LIVER: ICD-10-CM

## 2024-01-18 DIAGNOSIS — R73.01 IFG (IMPAIRED FASTING GLUCOSE): ICD-10-CM

## 2024-01-18 DIAGNOSIS — N20.1 LEFT URETERAL STONE: ICD-10-CM

## 2024-01-18 DIAGNOSIS — Z23 NEEDS FLU SHOT: ICD-10-CM

## 2024-01-18 DIAGNOSIS — Z91.09 ENVIRONMENTAL ALLERGIES: ICD-10-CM

## 2024-01-18 DIAGNOSIS — Z80.0 FAMILY HX OF COLON CANCER: ICD-10-CM

## 2024-01-18 DIAGNOSIS — M10.9 PODAGRA: ICD-10-CM

## 2024-01-18 DIAGNOSIS — Z00.01 ENCOUNTER FOR GENERAL ADULT MEDICAL EXAMINATION WITH ABNORMAL FINDINGS: Primary | ICD-10-CM

## 2024-01-18 DIAGNOSIS — Z15.09 LYNCH SYNDROME: ICD-10-CM

## 2024-01-18 PROBLEM — G47.36 HYPOXEMIA ASSOCIATED WITH SLEEP: Status: RESOLVED | Noted: 2023-06-10 | Resolved: 2024-01-18

## 2024-01-18 RX ORDER — LISINOPRIL 30 MG/1
30 TABLET ORAL DAILY
Qty: 90 TABLET | Refills: 2 | Status: SHIPPED | OUTPATIENT
Start: 2024-01-18

## 2024-01-18 NOTE — PROGRESS NOTES
"Annual Exam      HPI  Omar Ha is a 53 y.o. male RTC in yearly, review of medical issues: has been doing well.  Was skiing last week in [place], 9-12K feet and did feel the altitude.  \"It took a couple of days'.    1. Recurrent nephrolithiasis - s/p CYSTOSCOPY, BILATERAL RETROGRADE PYELOGRAM, LEFT URETEROSCOPY LASER LITHOTRIPSY, STENT PLACEMENT 8/3/22.   Two remnant stones and is watching them. Was supposed to see urology in 2/2023, \"I need to go back in\".     2.  HTN - dx'd after persistent blood pressure elevation in office at urology. \"I test regularly, it is always 120-135/ 85-90.  This is with rest period prior to checking.  On lisinopril 10mg daily.   3. MARGOT  - new dx in 2022. Compliant on CPAP nightly.  \"I think I need to talk to them about it'.  Feels like is more energetic and no longer gets tired in the afternoon like use to at times.  Is still 'not a morning person'. 'I crave it. I want to put it on when I go to bed'.  Sleeps 8 hours nightly.    4. Vitamin D deficiency - on 1000 I.U. Vitamin D3 OTC daily.   5. Environmental Allergies, never been tested.  - \"I have had some allergy issues\".  \"Itchy eyes and runny nose, even in winter'.  Using Claritin med PRN only.   6. External hemorrhoids - RACIEL, no bleeding or pain. Is on fiber daily, now with smoothies daily.      Review of Systems   Constitutional: Positive for weight gain (5# in last year, less active with busy work schedule.). Negative for chills, fever and malaise/fatigue.   HENT:  Negative for congestion, hearing loss (some loss, discrimination in crowds, does not feel needs eval), odynophagia and sore throat.    Eyes:  Negative for discharge, double vision, pain and redness.        Eye exam next week, wearing glasses and contacts     Cardiovascular:  Negative for chest pain, dyspnea on exertion, irregular heartbeat, leg swelling, near-syncope, palpitations and syncope.   Respiratory:  Negative for cough and shortness of breath.  "   Endocrine: Negative for polydipsia, polyphagia and polyuria.   Hematologic/Lymphatic: Negative for bleeding problem. Does not bruise/bleed easily.   Skin:  Negative for rash and suspicious lesions.   Musculoskeletal:  Negative for joint pain, joint swelling, muscle cramps, muscle weakness and myalgias.   Gastrointestinal:  Negative for constipation, diarrhea, dysphagia, heartburn, nausea and vomiting.   Genitourinary:  Negative for dysuria, frequency, hematuria, hesitancy and incomplete emptying.   Neurological:  Negative for excessive daytime sleepiness, dizziness, headaches and light-headedness.   Psychiatric/Behavioral:  Negative for depression. The patient does not have insomnia and is not nervous/anxious.    Allergic/Immunologic: Positive for environmental allergies. Negative for persistent infections.       Problem List:    Patient Active Problem List   Diagnosis    Family hx of colon cancer    Lindquist syndrome    Vitamin D deficiency    Environmental allergies    External hemorrhoids    IFG (impaired fasting glucose)    DDD (degenerative disc disease), cervical    Fatty liver    Left ureteral stone    MARGOT on auto CPAP    Primary hypertension    Podagra       Medical History:    Past Medical History:   Diagnosis Date    Allergic Possibly penicillin    Cholelithiasis 2/2022    small stone detected    Chronic bilateral low back pain without sciatica 12/04/2017    Colon polyp     a few polyps have been removed from my colon and stomach    Environmental allergies 12/16/2019    External hemorrhoids 12/16/2019    Hx bleeding in 2019    Gastric polyps 2020    History of colon polyps     Hypertension     Hypoxemia associated with sleep     Kidney stone on left side 07/2022    Nephrolithiasis 2007    unknown type of stone    MARGOT on auto CPAP 04/14/2022    Home sleep study.  Weight 209 pounds.  Moderate MARGOT with AHI 20.7 events per hour.  When supine, severe at 45.6 events per hour.  On his right side, mild at 7.2  events per hour.  Low O2 saturation 75% And sleep-related hypoxia present for 24.3 minutes.    Vitamin D deficiency 12/11/2018        Social History:    Social History     Socioeconomic History    Marital status:     Number of children: 3   Tobacco Use    Smoking status: Never    Smokeless tobacco: Never   Vaping Use    Vaping Use: Never used   Substance and Sexual Activity    Alcohol use: Yes     Alcohol/week: 2.0 standard drinks of alcohol     Types: 2 Cans of beer per week     Comment: 2-8 drinks/ month    Drug use: No     Comment: tried THC for sleep, did not help, stopped in 2023    Sexual activity: Yes     Partners: Female     Birth control/protection: Vasectomy     Comment: wife only; no hx STD's       Family History:   Family History   Problem Relation Age of Onset    Mitral valve prolapse Mother     Uterine cancer Mother     Colon cancer Mother         Lindquist Syndrome    Hypertension Mother     Hypothyroidism Mother     Melanoma Mother     Squamous cell carcinoma Mother     Arthritis Mother     Cancer Mother         Many different types of cancer    Leukemia Father         CLL    Skin cancer Father     Bell's palsy Father     Hypertension Father     Hyperlipidemia Father     Heart failure Father     Heart disease Father         CABG x 4    Diabetes Father     Skin cancer Sister     Colon cancer Brother         Lindquist Syndrome    No Known Problems Daughter     No Known Problems Daughter     No Known Problems Son     Malig Hyperthermia Neg Hx        Surgical History:   Past Surgical History:   Procedure Laterality Date    COLONOSCOPY  2020    COLONOSCOPY  12/01/2022    ENDOSCOPY  2020    ENDOSCOPY  12/01/2022    FRACTURE SURGERY  1986    Broken knee    PATELLA FRACTURE SURGERY Right 1986    URETEROSCOPY LASER LITHOTRIPSY WITH STENT INSERTION Left 08/03/2022    Procedure: CYSTOSCOPY, BILATERAL RETROGRADE PYELOGRAM, LEFT URETEROSCOPY LASER LITHOTRIPSY, STENT PLACEMENT;  Surgeon: Ryder Sanchez MD;   Location: [facility] OR;  Service: Urology;  Laterality: Left;         Current Outpatient Medications:     cholecalciferol (VITAMIN D3) 25 MCG (1000 UT) tablet, Take 1 tablet by mouth Daily., Disp:  , Rfl:     NON FORMULARY, Take 1 each by mouth Daily. Beet juice, Disp: , Rfl:     NON FORMULARY, Take 1 each by mouth Daily. Tart cherry, Disp: , Rfl:     lisinopril (PRINIVIL,ZESTRIL) 30 MG tablet, Take 1 tablet by mouth Daily., Disp: 90 tablet, Rfl: 2    psyllium (METAMUCIL) 58.6 % packet, Take 1 packet by mouth Daily., Disp: , Rfl:     Vitals:    01/18/24 1005   BP: 164/100   Pulse: 67   Temp: 97.5 °F (36.4 °C)   SpO2: 98%     Body mass index is 29.58 kg/m².    Physical Exam  Vitals reviewed.   Constitutional:       General: He is not in acute distress.     Appearance: Normal appearance. He is well-developed. He is not ill-appearing or toxic-appearing.   HENT:      Head: Normocephalic and atraumatic.      Right Ear: Hearing, tympanic membrane, ear canal and external ear normal. There is no impacted cerumen.      Left Ear: Hearing, tympanic membrane, ear canal and external ear normal. There is no impacted cerumen.      Nose: Nose normal.      Mouth/Throat:      Mouth: Mucous membranes are moist. No oral lesions.      Tongue: No lesions.      Pharynx: Oropharynx is clear. Uvula midline. No pharyngeal swelling, oropharyngeal exudate, posterior oropharyngeal erythema or uvula swelling.   Eyes:      General: Lids are normal. No scleral icterus.        Right eye: No discharge.         Left eye: No discharge.      Extraocular Movements: Extraocular movements intact.      Conjunctiva/sclera: Conjunctivae normal.      Pupils: Pupils are equal, round, and reactive to light.   Neck:      Thyroid: No thyroid mass or thyromegaly.      Vascular: No carotid bruit.   Cardiovascular:      Rate and Rhythm: Normal rate and regular rhythm.      Pulses:           Radial pulses are 2+ on the right side and 2+ on the left side.         Dorsalis pedis pulses are 2+ on the right side and 2+ on the left side.        Posterior tibial pulses are 2+ on the right side and 2+ on the left side.      Heart sounds: Normal heart sounds, S1 normal and S2 normal. No murmur heard.     No friction rub. No gallop.   Pulmonary:      Effort: Pulmonary effort is normal. No respiratory distress.      Breath sounds: Normal breath sounds. No wheezing, rhonchi or rales.   Abdominal:      General: Bowel sounds are normal. There is no distension.      Palpations: Abdomen is soft. There is no mass.      Tenderness: There is no abdominal tenderness. There is no guarding or rebound.   Genitourinary:     Prostate: Normal. Not enlarged, not tender and no nodules present.      Rectum: External hemorrhoid present. No tenderness. Normal anal tone.   Musculoskeletal:         General: No deformity. Normal range of motion.      Right shoulder: No tenderness, bony tenderness or crepitus. Normal range of motion.      Left shoulder: No tenderness, bony tenderness or crepitus. Normal range of motion.      Right hand: No tenderness or bony tenderness. Normal range of motion. Normal strength.      Left hand: No tenderness or bony tenderness. Normal range of motion. Normal strength.      Cervical back: Full passive range of motion without pain, normal range of motion and neck supple.      Right lower leg: No edema.      Left lower leg: No edema.   Lymphadenopathy:      Cervical: No cervical adenopathy.      Right cervical: No superficial, deep or posterior cervical adenopathy.     Left cervical: No superficial, deep or posterior cervical adenopathy.      Upper Body:      Right upper body: No supraclavicular, axillary or pectoral adenopathy.      Left upper body: No supraclavicular, axillary or pectoral adenopathy.   Skin:     General: Skin is warm and dry.      Findings: No rash.   Neurological:      Mental Status: He is alert and oriented to person, place, and time.      Cranial Nerves: No  cranial nerve deficit, dysarthria or facial asymmetry.      Sensory: No sensory deficit.      Motor: No weakness, tremor, atrophy or abnormal muscle tone.      Gait: Gait normal.      Deep Tendon Reflexes: Reflexes are normal and symmetric.      Reflex Scores:       Patellar reflexes are 2+ on the right side and 2+ on the left side.       Achilles reflexes are 2+ on the right side and 2+ on the left side.  Psychiatric:         Attention and Perception: Attention normal.         Mood and Affect: Mood normal.         Speech: Speech normal.         Behavior: Behavior normal. Behavior is cooperative.         Thought Content: Thought content normal.         Assessment/ Plan  Diagnoses and all orders for this visit:    Encounter for general adult medical examination with abnormal findings    Primary hypertension    MARGOT on auto CPAP  -     Pneumococcal Conjugate Vaccine 20-Valent All    Environmental allergies    External hemorrhoids    Family hx of colon cancer    Fatty liver    IFG (impaired fasting glucose)    Left ureteral stone    Lindquist syndrome    Podagra    Needs flu shot  -     Fluzone (or Fluarix & Flulaval for VFC) >6mos    Other orders  -     lisinopril (PRINIVIL,ZESTRIL) 30 MG tablet; Take 1 tablet by mouth Daily.        Return in about 4 weeks (around 2/15/2024) for Recheck.      Discussion:  Omar Ha is a 53 y.o. male RTC in yearly, review of medical issues:   1. Recurrent nephrolithiasis - s/p CYSTOSCOPY, BILATERAL RETROGRADE PYELOGRAM, LEFT URETEROSCOPY LASER LITHOTRIPSY, STENT PLACEMENT 8/3/22.   Two remnant stones on watchful waiting.  Due for urology appointment, patient to schedule.  UA clear on labs today.     2.  HTN, dx'd after persistent blood pressure elevation in office at urology -progressive issue, not at goal on home log on low-dose lisinopril 10 mg daily.  Increased dose to 30 mg daily, D/W rationale with patient.  Trend pressure and home log in 4 weeks.  3. MARGOT  - new dx in 2022. Compliant  on CPAP nightly.  Daytime energy improved with use of CPAP, though patient still wishes he had more energy first thing in the morning.  F/U sleep med to ensure pressures at goal.  Adequate sleep volume encouraged.  Prevnar 20 today.  4. Hx of L great toe MTP pain c/w gout - resolved with Colchicine at prior visit. No recurrence since.  Uric acid mild elevation in past, but pt has made TLC mods and no recurrence of sx. allopurinol additional on hold at this time, but will need initiation for any recurrent events.  5. 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 Yen, intestinal metaplasia without dysplasia noted in stomach, no polyps.  Repeat EGD/ C-scope in 12/2022, one polyp removed --> 12/2024.   Cysto OK with urology 2022.  BARON/PSA OK today.  6. Vitamin D deficiency - on 1000 I.U. Vitamin D3 OTC daily.   7. Environmental Alleriges, no prior testing  - Claritin PRN.    8. Fatty liver, US shown/Hx ALT elevation/ IFG  - weight progressive due to decline in exercise.  Fasting blood sugar elevation noted, but A1c and LFTs normal today.  Restart exercise and C/W MARGOT treatment.  Weight loss advised.    9. External hemorrhoids - RACIEL, no bleeding or pain.  On fiber supplement or high-fiber smoothies daily.  10. HM - labs d/w pt; flu/Prevnar 20-today; Tdap/ Hep A/ COVID - UTD; COVID update and Shingrix at pharmacy; C-scope/ EGD 12/2022 (1 polyp SSA) --> 2 years; BARON/ PSA OK today; Hep C Ab (-) 12/2020; augment exercise for weight loss.    RTC 4 weeks, BMP same day

## 2024-02-20 ENCOUNTER — OFFICE VISIT (OUTPATIENT)
Dept: INTERNAL MEDICINE | Facility: CLINIC | Age: 54
End: 2024-02-20
Payer: COMMERCIAL

## 2024-02-20 VITALS
SYSTOLIC BLOOD PRESSURE: 120 MMHG | WEIGHT: 213.6 LBS | TEMPERATURE: 97.6 F | HEIGHT: 71 IN | HEART RATE: 60 BPM | BODY MASS INDEX: 29.9 KG/M2 | OXYGEN SATURATION: 97 % | DIASTOLIC BLOOD PRESSURE: 70 MMHG

## 2024-02-20 DIAGNOSIS — G47.33 OSA ON CPAP: ICD-10-CM

## 2024-02-20 DIAGNOSIS — I10 PRIMARY HYPERTENSION: Primary | ICD-10-CM

## 2024-02-20 LAB
BUN SERPL-MCNC: 18 MG/DL (ref 6–20)
BUN/CREAT SERPL: 16.4 (ref 7–25)
CALCIUM SERPL-MCNC: 9.6 MG/DL (ref 8.6–10.5)
CHLORIDE SERPL-SCNC: 102 MMOL/L (ref 98–107)
CO2 SERPL-SCNC: 26.8 MMOL/L (ref 22–29)
CREAT SERPL-MCNC: 1.1 MG/DL (ref 0.76–1.27)
EGFRCR SERPLBLD CKD-EPI 2021: 79.3 ML/MIN/1.73
GLUCOSE SERPL-MCNC: 95 MG/DL (ref 65–99)
POTASSIUM SERPL-SCNC: 4.6 MMOL/L (ref 3.5–5.2)
SODIUM SERPL-SCNC: 140 MMOL/L (ref 136–145)

## 2024-02-20 PROCEDURE — 99213 OFFICE O/P EST LOW 20 MIN: CPT | Performed by: INTERNAL MEDICINE

## 2024-02-20 NOTE — PROGRESS NOTES
"Primary hypertension      HPI  Omar aH is a 53 y.o. male RTC in short interval f/u:  1.  HTN, dx'd after persistent blood pressure elevation in office at urology - progressive issue last visit  and increased lisinopril to 30mg daily. 'I dont notice anything', in terms of side effects.  Good home log this AM but had not checked prior to this AM.   2. MARGOT  - new dx in 2022. Compliant on CPAP nightly.  'Every night\".     Answers submitted by the patient for this visit:  Primary Reason for Visit (Submitted on 2/13/2024)  What is the primary reason for your visit?: High Blood Pressure  High Blood Pressure Questionnaire (Submitted on 2/13/2024)  Chief Complaint: Hypertension  Chronicity: recurrent  Onset: more than 1 year ago  Progression since onset: stable  anxiety: No  peripheral edema: No  Agents associated with hypertension: no associated agents  Compliance problems: no compliance problems    Review of Systems   Constitutional: Negative for malaise/fatigue.   Eyes:  Negative for blurred vision.   Cardiovascular:  Negative for chest pain, orthopnea and palpitations.   Respiratory:  Negative for shortness of breath.    Neurological:  Negative for dizziness, headaches and light-headedness.       The following portions of the patient's history were reviewed and updated as appropriate: allergies, current medications, past medical history, past social history, and problem list.      Current Outpatient Medications:     cholecalciferol (VITAMIN D3) 25 MCG (1000 UT) tablet, Take 1 tablet by mouth Daily., Disp:  , Rfl:     lisinopril (PRINIVIL,ZESTRIL) 30 MG tablet, Take 1 tablet by mouth Daily., Disp: 90 tablet, Rfl: 2    NON FORMULARY, Take 1 each by mouth Daily. Beet juice, Disp: , Rfl:     NON FORMULARY, Take 1 each by mouth Daily. Tart cherry, Disp: , Rfl:     psyllium (METAMUCIL) 58.6 % packet, Take 1 packet by mouth Daily., Disp: , Rfl:     Vitals:    02/20/24 0933   BP: 120/70   BP Location: Left arm   Patient " "Position: Sitting   Cuff Size: Adult   Pulse: 60   Temp: 97.6 °F (36.4 °C)   TempSrc: Infrared   SpO2: 97%   Weight: 96.9 kg (213 lb 9.6 oz)   Height: 180.3 cm (70.98\")     Body mass index is 29.8 kg/m².      Physical Exam  Vitals reviewed.   Constitutional:       General: He is not in acute distress.     Appearance: He is well-developed. He is not ill-appearing or toxic-appearing.   HENT:      Head: Normocephalic and atraumatic.      Mouth/Throat:      Mouth: No oral lesions.      Tongue: No lesions.      Pharynx: No pharyngeal swelling or uvula swelling.   Eyes:      General: No scleral icterus.  Neck:      Vascular: No carotid bruit.   Cardiovascular:      Rate and Rhythm: Normal rate and regular rhythm.      Pulses:           Carotid pulses are 2+ on the right side and 2+ on the left side.       Radial pulses are 2+ on the right side and 2+ on the left side.      Heart sounds: Normal heart sounds.   Pulmonary:      Effort: Pulmonary effort is normal. No respiratory distress.      Breath sounds: Normal breath sounds. No wheezing, rhonchi or rales.   Musculoskeletal:      Cervical back: Normal range of motion and neck supple. No muscular tenderness.   Neurological:      Mental Status: He is alert and oriented to person, place, and time.      Cranial Nerves: No cranial nerve deficit.      Gait: Gait normal.   Psychiatric:         Attention and Perception: Attention normal.         Mood and Affect: Mood and affect normal.         Behavior: Behavior normal.         Thought Content: Thought content normal.         Assessment/ Plan  Diagnoses and all orders for this visit:    Primary hypertension  -     Basic Metabolic Panel    MARGOT on auto CPAP        Return for Annual physical.      Discussion:  Omar Ha is a 53 y.o. male RTC in short interval f/u:  1.  HTN, dx'd after persistent blood pressure elevation in office at urology - progressive issue last visit, now controlled on increased lisinopril to 30mg daily.  " Limited home log, advise more regular checks and reviewed goal pressure numbers.  Check BMP today.   2. MARGOT  - new dx in 2022. Compliant on CPAP nightly.  Reviewed correlation with HTN. Prevnar 20 UTD. F/U pulmonary planned in 5/2024.     RTC 11 months CPE, F labs prior

## 2024-05-22 ENCOUNTER — OFFICE VISIT (OUTPATIENT)
Dept: ORTHOPEDIC SURGERY | Facility: CLINIC | Age: 54
End: 2024-05-22
Payer: COMMERCIAL

## 2024-05-22 VITALS — WEIGHT: 202 LBS | HEIGHT: 70 IN | BODY MASS INDEX: 28.92 KG/M2 | TEMPERATURE: 98.2 F

## 2024-05-22 DIAGNOSIS — M47.816 OSTEOARTHRITIS OF LUMBAR SPINE, UNSPECIFIED SPINAL OSTEOARTHRITIS COMPLICATION STATUS: ICD-10-CM

## 2024-05-22 DIAGNOSIS — S76.912A MUSCLE STRAIN OF LEFT THIGH, INITIAL ENCOUNTER: ICD-10-CM

## 2024-05-22 DIAGNOSIS — M79.652 PAIN OF LEFT THIGH: ICD-10-CM

## 2024-05-22 DIAGNOSIS — M25.562 LEFT KNEE PAIN, UNSPECIFIED CHRONICITY: Primary | ICD-10-CM

## 2024-05-22 RX ORDER — METHYLPREDNISOLONE 4 MG/1
TABLET ORAL
Qty: 21 TABLET | Refills: 0 | Status: SHIPPED | OUTPATIENT
Start: 2024-05-22

## 2024-05-22 NOTE — PROGRESS NOTES
Patient Name: Omar Ha   YOB: 1970  Referring Primary Care Physician: Andres Grayson MD  BMI: Body mass index is 28.98 kg/m².    Chief Complaint:    Chief Complaint   Patient presents with    Left Thigh - Pain        HPI: Patient is playing pickle ball on April 14 bent backwards and then forward quickly and had pain in his upper thigh he stopped playing pickle ball and was concerned about his thigh let it go took some Advil and applied an ace and has been doing okay.  He went to the [facility] walked a lot and felt like he needed to sit down and had numbness to his anterior thigh denies any history of back problems in the past there is no particular pain it is waxing and waning numbness sensation and he is very eager to get back to his sports and his activities of daily living    Omar Ha is a 53 y.o. male who presents today for evaluation of   Chief Complaint   Patient presents with    Left Thigh - Pain         Subjective   Medications:   Home Medications:  Current Outpatient Medications on File Prior to Visit   Medication Sig    cholecalciferol (VITAMIN D3) 25 MCG (1000 UT) tablet Take 1 tablet by mouth Daily.    IBUPROFEN PO Take  by mouth As Needed.    lisinopril (PRINIVIL,ZESTRIL) 30 MG tablet Take 1 tablet by mouth Daily.    NON FORMULARY Take 1 each by mouth Daily. Beet juice (Patient not taking: Reported on 5/22/2024)    NON FORMULARY Take 1 each by mouth Daily. Tart cherry (Patient not taking: Reported on 5/22/2024)    psyllium (METAMUCIL) 58.6 % packet Take 1 packet by mouth Daily.     No current facility-administered medications on file prior to visit.     Current Medications:  Scheduled Meds:  Continuous Infusions:No current facility-administered medications for this visit.    PRN Meds:.    I have reviewed the patient's medical history in detail and updated the computerized patient record.  Review and summarization of old records includes:    Past Medical History:   Diagnosis Date     Allergic Possibly penicillin    Cholelithiasis 2/2022    small stone detected    Chronic bilateral low back pain without sciatica 12/04/2017    Colon polyp     a few polyps have been removed from my colon and stomach    Environmental allergies 12/16/2019    External hemorrhoids 12/16/2019    Hx bleeding in 2019    Gastric polyps 2020    History of colon polyps     Hypertension     Hypoxemia associated with sleep     Kidney stone on left side 07/2022    Nephrolithiasis 2007    unknown type of stone    MARGOT on auto CPAP 04/14/2022    Home sleep study.  Weight 209 pounds.  Moderate MARGOT with AHI 20.7 events per hour.  When supine, severe at 45.6 events per hour.  On his right side, mild at 7.2 events per hour.  Low O2 saturation 75% And sleep-related hypoxia present for 24.3 minutes.    Vitamin D deficiency 12/11/2018        Past Surgical History:   Procedure Laterality Date    COLONOSCOPY  2020    COLONOSCOPY  12/01/2022    ENDOSCOPY  2020    ENDOSCOPY  12/01/2022    FRACTURE SURGERY  1986    Broken knee    PATELLA FRACTURE SURGERY Right 1986    URETEROSCOPY LASER LITHOTRIPSY WITH STENT INSERTION Left 08/03/2022    Procedure: CYSTOSCOPY, BILATERAL RETROGRADE PYELOGRAM, LEFT URETEROSCOPY LASER LITHOTRIPSY, STENT PLACEMENT;  Surgeon: Ryder Sanchez MD;  Location: [facility];  Service: Urology;  Laterality: Left;    VASECTOMY  2005        Social History     Occupational History    Occupation:      Comment: [employer]   Tobacco Use    Smoking status: Never     Passive exposure: Past    Smokeless tobacco: Never    Tobacco comments:     I never smoked but my parents smoked, and 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   Vaping Use    Vaping status: Never Used   Substance and Sexual Activity    Alcohol use: Yes     Alcohol/week: 2.0 standard drinks of alcohol     Types: 2 Cans of beer per week     Comment: 2-8 drinks/ month    Drug use: No     Comment: tried THC for sleep, did not help, stopped in  "2023    Sexual activity: Yes     Partners: Female     Birth control/protection: Vasectomy     Comment: wife only; no hx STD's      Social History     Social History Narrative    Diet - healthy overall, eats fruits and vegetables; 2018 \"loose\" Paleo diet 3-4 months    Exercise - P90X in past; none in 2020, has new treadmill; 2023 mostly pickleball 3-4x/ week    Caffeine - tea, decaf --> caffeinated        Family History   Problem Relation Age of Onset    Mitral valve prolapse Mother     Uterine cancer Mother     Colon cancer Mother         Lindquist Syndrome    Hypertension Mother     Hypothyroidism Mother     Melanoma Mother     Squamous cell carcinoma Mother     Arthritis Mother     Cancer Mother         Many different types of cancer    Thyroid disease Mother         Underactive thyroid    Leukemia Father         CLL    Skin cancer Father     Bell's palsy Father     Hypertension Father     Hyperlipidemia Father     Heart failure Father     Heart disease Father         CABG x 4    Diabetes Father     Skin cancer Sister     Colon cancer Brother         Lindquist Syndrome    Cancer Brother         Colon at 48ish years old    No Known Problems Daughter     No Known Problems Daughter     No Known Problems Son     Diabetes Maternal Grandfather     Early death Maternal Grandmother         Breast cancer    Malig Hyperthermia Neg Hx        ROS: 14 point review of systems was performed and all other systems were reviewed and are negative except for documented findings in HPI and today's encounter.     Allergies:   Allergies   Allergen Reactions    Morphine Itching     Constitutional:  Denies fever, shaking or chills   Eyes:  Denies change in visual acuity   HENT:  Denies nasal congestion or sore throat   Respiratory:  Denies cough or shortness of breath   Cardiovascular:  Denies chest pain or severe LE edema   GI:  Denies abdominal pain, nausea, vomiting, bloody stools or diarrhea   Musculoskeletal:  Numbness, tingling, pain, or " "loss of motor function only as noted above in history of present illness.  : Denies painful urination or hematuria  Integument:  Denies rash, lesion or ulceration   Neurologic:  Denies headache or focal weakness  Endocrine:  Denies lymphadenopathy  Psych:  Denies confusion or change in mental status   Hem:  Denies active bleeding    OBJECTIVE:  Physical Exam: 53 y.o. male  Wt Readings from Last 3 Encounters:   05/22/24 91.6 kg (202 lb)   02/20/24 96.9 kg (213 lb 9.6 oz)   01/18/24 96.2 kg (212 lb)     Ht Readings from Last 1 Encounters:   05/22/24 177.8 cm (70\")     Body mass index is 28.98 kg/m².  Vitals:    05/22/24 1004   Temp: 98.2 °F (36.8 °C)     Vital signs reviewed.     General Appearance:    Alert, cooperative, in no acute distress                  Eyes: conjunctiva clear  ENT: external ears and nose atraumatic  CV: no peripheral edema  Resp: normal respiratory effort  Skin: no rashes or wounds; normal turgor  Psych: mood and affect appropriate  Lymph: no nodes appreciated  Neuro: gross sensation intact  Vascular:  Palpable peripheral pulse in noted extremity  Musculoskeletal Extremities: Skin is warm dry and intact with good pulses mood sensation. Patient ambulates without assistive devices and has an normal gait.  Calves are soft and nontender.    Radiology:   3 views lumbar spine done for pain with no comparison films show preserved disc height no fracture  3 views left hip done for pain no comparison views no fracture minimal osteoarthritic changes  Views left knee done for pain with no comparison views osteoarthritic changes within the patellofemoral joint  Assessment:     ICD-10-CM ICD-9-CM   1. Left knee pain, unspecified chronicity  M25.562 719.46   2. Pain of left thigh  M79.652 729.5   3. Muscle strain of left thigh, initial encounter  S76.912A 843.9   4. Osteoarthritis of lumbar spine, unspecified spinal osteoarthritis complication status  M47.816 721.3        Procedures    Lengthy discussion " with patient trying to ascertain the source of his pain it is really not pain at all it is more numbness so he has anterior thigh numbness that is improved with rest that started after a dorsiflexion backwards and then forwards playing pickle ball if pain persist and numbness persist would consider working up his back and have him seeing Radha Nambe or one of the neuro people will try anti-inflammatory Medrol Dosepak take it consistently pay close attention to see if it helps and then can go back to normal activities of daily living  MDM/Plan:    30 min spent face to face with patient 30 min spent counseling about:  The diagnosis(es), natural history, pathophysiology and treatment for diagnosis(es) were discussed. Opportunity given and questions answered.  Biomechanics of pertinent body areas discussed.  When appropriate, the use of ambulatory aids discussed.  EXERCISES:  Advice on benefits of, and types of regular/moderate exercise pertaining to orthopedic diagnosis(es).  MEDICATIONS:  The risks, benefits, warnings,side effects and alternatives of medications discussed.  Inflammation/pain control; with cold, heat, elevation and/or liniments discussed as appropriate  MEDICAL RECORDS reviewed from other provider(s) for past and current medical history pertinent to this complaint.  Discussed calling to schedule MRI if not significantly better with current treatment.       5/23/2024    Much of this encounter note is an electronic transcription/translation of spoken language to printed text. The electronic translation of spoken language may permit erroneous, or at times, nonsensical words or phrases to be inadvertently transcribed; Although I have reviewed the note for such errors, some may still exist

## 2024-06-04 ENCOUNTER — OFFICE VISIT (OUTPATIENT)
Dept: SLEEP MEDICINE | Facility: HOSPITAL | Age: 54
End: 2024-06-04
Payer: COMMERCIAL

## 2024-06-04 VITALS — OXYGEN SATURATION: 93 % | BODY MASS INDEX: 30.06 KG/M2 | HEART RATE: 78 BPM | WEIGHT: 210 LBS | HEIGHT: 70 IN

## 2024-06-04 DIAGNOSIS — G47.33 OSA ON CPAP: Primary | ICD-10-CM

## 2024-06-04 DIAGNOSIS — G47.36 HYPOXEMIA ASSOCIATED WITH SLEEP: ICD-10-CM

## 2024-06-04 PROCEDURE — G0463 HOSPITAL OUTPT CLINIC VISIT: HCPCS

## 2024-06-04 PROCEDURE — 99213 OFFICE O/P EST LOW 20 MIN: CPT | Performed by: INTERNAL MEDICINE

## 2024-06-04 NOTE — PROGRESS NOTES
"Follow Up Sleep Disorders Center Note     Chief Complaint:  MARGOT     Primary Care Physician: Andres Grayson MD    Interval History:   The patient is a 53 y.o. male who I last saw 5/31/2023 and that note was reviewed.  The patient reports he is doing well.  He still feels some tiredness in the morning.  He goes to bed between 11:30 PM and midnight and falls asleep quickly.  Gets out of bed at 7:30 AM.  He awakens due to stress or to use the restroom.    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   Vaping Use    Vaping status: Never Used   Substance and Sexual Activity    Alcohol use: Yes     Alcohol/week: 2.0 standard drinks of alcohol     Types: 2 Cans of beer per week     Comment: 2-8 drinks/ month    Drug use: No     Comment: tried THC for sleep, did not help, stopped in 2023    Sexual activity: Yes     Partners: Female     Birth control/protection: Vasectomy     Comment: wife only; no hx STD's       Allergies:  Morphine     Medication Review:  Reviewed.  Lisinopril vitamin Metamucil    Vital Signs:    Vitals:    06/04/24 0936   Pulse: 78   SpO2: 93%   Weight: 95.3 kg (210 lb)   Height: 177.8 cm (70\")     Body mass index is 30.13 kg/m².    Physical Exam:    Constitutional:  Well developed 53 y.o. male that appears in no apparent distress.  Awake & oriented times 3.  Normal mood with normal recent and remote memory and normal judgement.  Eyes:  Conjunctivae normal.  Oropharynx: Previously, moist mucous membranes without exudate and a large tongue and class II Mallampati airway.    Self-administered [place] Sleepiness Scale test " results: 1  0-5 Lower normal daytime sleepiness  6-10 Higher normal daytime sleepiness  11-12 Mild, 13-15 Moderate, & 16-24 Severe excessive daytime sleepiness     Downloaded PAP Data Evaluated For Therapeutic Response and Compliance:  DME is [facility] and he uses a fullface mask.  Downloads between 3/4 and 6/1/2024 compliance 99%.  Average usage is 6 hours and 34 minutes.  Average AHI is normal without leak.  Average auto CPAP pressure is 9.9 and his [employer] Medical auto CPAP is set at 8-14    I have reviewed the above results and compared them with the patient's last downloads and reviewed with the patient.    Impression:   Moderate severity obstructive sleep apnea, severe when supine, with sleep-related hypoxia by home sleep study 4/14/2022, weight 209 pounds, adequately treated with [employer] auto CPAP and he is at goal with good compliance and usage and no complaints of hypersomnolence.     Plan:  Good sleep hygiene measures should be maintained.  Weight loss would be beneficial in this patient who is obese by Body mass index is 30.13 kg/m².      After evaluating the patient and assessing results available, the patient is benefiting from the treatment being provided.     Due to complaints of awakening feeling like he has not had a good night sleep?  I encouraged at least 30 minutes more of auto CPAP usage.    The patient will continue [employer] auto CPAP.  Potential side effects of not using PAP therapy reviewed and addressed as needed.  After clinical evaluation and review of downloads, I recommend no changes to the patient's pressures.  A new prescription will be sent to the patient's DME.    I answered all of the patient's questions.  The patient will call the [facility] Center for any problems and will follow up in 1 year.      Boston Olivo MD  Sleep Medicine  06/04/24  09:45 EDT

## 2024-08-08 ENCOUNTER — OFFICE VISIT (OUTPATIENT)
Dept: INTERNAL MEDICINE | Facility: CLINIC | Age: 54
End: 2024-08-08
Payer: COMMERCIAL

## 2024-08-08 VITALS
HEART RATE: 102 BPM | TEMPERATURE: 99.8 F | BODY MASS INDEX: 29.15 KG/M2 | DIASTOLIC BLOOD PRESSURE: 70 MMHG | OXYGEN SATURATION: 98 % | HEIGHT: 70 IN | WEIGHT: 203.6 LBS | SYSTOLIC BLOOD PRESSURE: 117 MMHG

## 2024-08-08 DIAGNOSIS — R05.1 ACUTE COUGH: Primary | ICD-10-CM

## 2024-08-08 PROCEDURE — 99213 OFFICE O/P EST LOW 20 MIN: CPT | Performed by: STUDENT IN AN ORGANIZED HEALTH CARE EDUCATION/TRAINING PROGRAM

## 2024-08-08 RX ORDER — BENZONATATE 100 MG/1
100 CAPSULE ORAL 3 TIMES DAILY PRN
Qty: 30 CAPSULE | Refills: 0 | Status: SHIPPED | OUTPATIENT
Start: 2024-08-08

## 2024-08-08 RX ORDER — AZITHROMYCIN 250 MG/1
TABLET, FILM COATED ORAL
Qty: 6 TABLET | Refills: 0 | Status: SHIPPED | OUTPATIENT
Start: 2024-08-08

## 2024-08-08 RX ORDER — CODEINE PHOSPHATE/GUAIFENESIN 10-100MG/5
5 LIQUID (ML) ORAL NIGHTLY PRN
Qty: 118 ML | Refills: 0 | Status: SHIPPED | OUTPATIENT
Start: 2024-08-08

## 2024-08-08 RX ORDER — AMOXICILLIN AND CLAVULANATE POTASSIUM 875; 125 MG/1; MG/1
1 TABLET, FILM COATED ORAL 2 TIMES DAILY
Qty: 10 TABLET | Refills: 0 | Status: SHIPPED | OUTPATIENT
Start: 2024-08-08 | End: 2024-08-13
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
## 2024-08-09 ENCOUNTER — TELEPHONE (OUTPATIENT)
Dept: INTERNAL MEDICINE | Facility: CLINIC | Age: 54
End: 2024-08-09
Payer: COMMERCIAL

## 2024-08-09 DIAGNOSIS — J18.9 PNEUMONIA OF LEFT LOWER LOBE DUE TO INFECTIOUS ORGANISM: Primary | ICD-10-CM

## 2024-08-09 LAB
ALBUMIN SERPL-MCNC: 4.4 G/DL (ref 3.5–5.2)
ALBUMIN/GLOB SERPL: 1.7 G/DL
ALP SERPL-CCNC: 90 U/L (ref 39–117)
ALT SERPL-CCNC: 32 U/L (ref 1–41)
AST SERPL-CCNC: 22 U/L (ref 1–40)
BASOPHILS # BLD AUTO: 0.04 10*3/MM3 (ref 0–0.2)
BASOPHILS NFR BLD AUTO: 0.4 % (ref 0–1.5)
BILIRUB SERPL-MCNC: 1.4 MG/DL (ref 0–1.2)
BUN SERPL-MCNC: 20 MG/DL (ref 6–20)
BUN/CREAT SERPL: 18.5 (ref 7–25)
CALCIUM SERPL-MCNC: 9.2 MG/DL (ref 8.6–10.5)
CHLORIDE SERPL-SCNC: 96 MMOL/L (ref 98–107)
CO2 SERPL-SCNC: 24.9 MMOL/L (ref 22–29)
CREAT SERPL-MCNC: 1.08 MG/DL (ref 0.76–1.27)
EGFRCR SERPLBLD CKD-EPI 2021: 81 ML/MIN/1.73
EOSINOPHIL # BLD AUTO: 0.24 10*3/MM3 (ref 0–0.4)
EOSINOPHIL NFR BLD AUTO: 2.7 % (ref 0.3–6.2)
ERYTHROCYTE [DISTWIDTH] IN BLOOD BY AUTOMATED COUNT: 12.7 % (ref 12.3–15.4)
GLOBULIN SER CALC-MCNC: 2.6 GM/DL
GLUCOSE SERPL-MCNC: 102 MG/DL (ref 65–99)
HCT VFR BLD AUTO: 43 % (ref 37.5–51)
HGB BLD-MCNC: 15.1 G/DL (ref 13–17.7)
IMM GRANULOCYTES # BLD AUTO: 0.02 10*3/MM3 (ref 0–0.05)
IMM GRANULOCYTES NFR BLD AUTO: 0.2 % (ref 0–0.5)
LYMPHOCYTES # BLD AUTO: 0.72 10*3/MM3 (ref 0.7–3.1)
LYMPHOCYTES NFR BLD AUTO: 8 % (ref 19.6–45.3)
MCH RBC QN AUTO: 30.6 PG (ref 26.6–33)
MCHC RBC AUTO-ENTMCNC: 35.1 G/DL (ref 31.5–35.7)
MCV RBC AUTO: 87.2 FL (ref 79–97)
MONOCYTES # BLD AUTO: 1.07 10*3/MM3 (ref 0.1–0.9)
MONOCYTES NFR BLD AUTO: 11.9 % (ref 5–12)
NEUTROPHILS # BLD AUTO: 6.89 10*3/MM3 (ref 1.7–7)
NEUTROPHILS NFR BLD AUTO: 76.8 % (ref 42.7–76)
NRBC BLD AUTO-RTO: 0 /100 WBC (ref 0–0.2)
PLATELET # BLD AUTO: 243 10*3/MM3 (ref 140–450)
POTASSIUM SERPL-SCNC: 3.9 MMOL/L (ref 3.5–5.2)
PROT SERPL-MCNC: 7 G/DL (ref 6–8.5)
RBC # BLD AUTO: 4.93 10*6/MM3 (ref 4.14–5.8)
SODIUM SERPL-SCNC: 137 MMOL/L (ref 136–145)
TSH SERPL DL<=0.005 MIU/L-ACNC: 1.29 UIU/ML (ref 0.27–4.2)
WBC # BLD AUTO: 8.98 10*3/MM3 (ref 3.4–10.8)

## 2024-08-09 NOTE — PROGRESS NOTES
Blood work reviewed and discussed with patient.  Left lower lobe pneumonia on chest x-ray.  He needs repeat x-ray in 2 weeks.

## 2024-08-09 NOTE — TELEPHONE ENCOUNTER
I called Omar Ha at [phone] at 16:56 EDT     Told patient x-ray confirmed left lower lobe pneumonia.  He needs to come for repeat chest x-ray in 2 weeks.  All of his blood work was normal.  Discussed his fatigue is likely related to current infection.  Patient notes he is already feeling better with antibiotics.  He still has some concerns about episodic right lower quadrant pain.  He is concerned this could be a sign he will develop appendicitis in the future.  I encouraged patient to seek evaluation if he develops persistent or worsening abdominal pain, fevers, anorexia, nausea or vomiting but otherwise it sounds like he can follow-up with his PCP for this.

## 2024-12-02 RX ORDER — LISINOPRIL 30 MG/1
30 TABLET ORAL DAILY
Qty: 90 TABLET | Refills: 2 | Status: SHIPPED | OUTPATIENT
Start: 2024-12-02

## 2025-01-16 DIAGNOSIS — E55.9 VITAMIN D DEFICIENCY: ICD-10-CM

## 2025-01-16 DIAGNOSIS — Z13.29 SCREENING FOR THYROID DISORDER: ICD-10-CM

## 2025-01-16 DIAGNOSIS — Z00.00 ANNUAL PHYSICAL EXAM: Primary | ICD-10-CM

## 2025-01-16 DIAGNOSIS — R73.01 IFG (IMPAIRED FASTING GLUCOSE): ICD-10-CM

## 2025-01-16 DIAGNOSIS — I10 PRIMARY HYPERTENSION: ICD-10-CM

## 2025-01-16 DIAGNOSIS — Z12.5 ENCOUNTER FOR SCREENING FOR MALIGNANT NEOPLASM OF PROSTATE: ICD-10-CM

## 2025-01-16 DIAGNOSIS — K76.0 FATTY LIVER: ICD-10-CM

## 2025-01-18 LAB
25(OH)D3+25(OH)D2 SERPL-MCNC: 30.7 NG/ML (ref 30–100)
ALBUMIN SERPL-MCNC: 4.5 G/DL (ref 3.5–5.2)
ALBUMIN/GLOB SERPL: 1.8 G/DL
ALP SERPL-CCNC: 51 U/L (ref 39–117)
ALT SERPL-CCNC: 42 U/L (ref 1–41)
APPEARANCE UR: CLEAR
AST SERPL-CCNC: 25 U/L (ref 1–40)
BACTERIA #/AREA URNS HPF: NORMAL /[HPF]
BASOPHILS # BLD AUTO: 0.02 10*3/MM3 (ref 0–0.2)
BASOPHILS NFR BLD AUTO: 0.5 % (ref 0–1.5)
BILIRUB SERPL-MCNC: 0.7 MG/DL (ref 0–1.2)
BILIRUB UR QL STRIP: NEGATIVE
BUN SERPL-MCNC: 18 MG/DL (ref 6–20)
BUN/CREAT SERPL: 15.5 (ref 7–25)
CALCIUM SERPL-MCNC: 9.7 MG/DL (ref 8.6–10.5)
CASTS URNS QL MICRO: NORMAL /LPF
CHLORIDE SERPL-SCNC: 103 MMOL/L (ref 98–107)
CHOLEST SERPL-MCNC: 218 MG/DL (ref 0–200)
CHOLEST/HDLC SERPL: 3.96 {RATIO}
CO2 SERPL-SCNC: 28.1 MMOL/L (ref 22–29)
COLOR UR: YELLOW
CREAT SERPL-MCNC: 1.16 MG/DL (ref 0.76–1.27)
EGFRCR SERPLBLD CKD-EPI 2021: 73.9 ML/MIN/1.73
EOSINOPHIL # BLD AUTO: 0.16 10*3/MM3 (ref 0–0.4)
EOSINOPHIL NFR BLD AUTO: 4.1 % (ref 0.3–6.2)
EPI CELLS #/AREA URNS HPF: NORMAL /HPF (ref 0–10)
ERYTHROCYTE [DISTWIDTH] IN BLOOD BY AUTOMATED COUNT: 13 % (ref 12.3–15.4)
GLOBULIN SER CALC-MCNC: 2.5 GM/DL
GLUCOSE SERPL-MCNC: 98 MG/DL (ref 65–99)
GLUCOSE UR QL STRIP: NEGATIVE
HBA1C MFR BLD: 5.1 % (ref 4.8–5.6)
HCT VFR BLD AUTO: 46.7 % (ref 37.5–51)
HDLC SERPL-MCNC: 55 MG/DL (ref 40–60)
HGB BLD-MCNC: 15.9 G/DL (ref 13–17.7)
HGB UR QL STRIP: NEGATIVE
IMM GRANULOCYTES # BLD AUTO: 0.01 10*3/MM3 (ref 0–0.05)
IMM GRANULOCYTES NFR BLD AUTO: 0.3 % (ref 0–0.5)
KETONES UR QL STRIP: NEGATIVE
LDLC SERPL CALC-MCNC: 147 MG/DL (ref 0–100)
LEUKOCYTE ESTERASE UR QL STRIP: NEGATIVE
LYMPHOCYTES # BLD AUTO: 1.14 10*3/MM3 (ref 0.7–3.1)
LYMPHOCYTES NFR BLD AUTO: 28.9 % (ref 19.6–45.3)
MCH RBC QN AUTO: 30.9 PG (ref 26.6–33)
MCHC RBC AUTO-ENTMCNC: 34 G/DL (ref 31.5–35.7)
MCV RBC AUTO: 90.7 FL (ref 79–97)
MICRO URNS: NORMAL
MICRO URNS: NORMAL
MONOCYTES # BLD AUTO: 0.42 10*3/MM3 (ref 0.1–0.9)
MONOCYTES NFR BLD AUTO: 10.6 % (ref 5–12)
NEUTROPHILS # BLD AUTO: 2.2 10*3/MM3 (ref 1.7–7)
NEUTROPHILS NFR BLD AUTO: 55.6 % (ref 42.7–76)
NITRITE UR QL STRIP: NEGATIVE
NRBC BLD AUTO-RTO: 0 /100 WBC (ref 0–0.2)
PH UR STRIP: 5.5 [PH] (ref 5–7.5)
PLATELET # BLD AUTO: 213 10*3/MM3 (ref 140–450)
POTASSIUM SERPL-SCNC: 4.6 MMOL/L (ref 3.5–5.2)
PROT SERPL-MCNC: 7 G/DL (ref 6–8.5)
PROT UR QL STRIP: NEGATIVE
PSA SERPL-MCNC: 2.98 NG/ML (ref 0–4)
RBC # BLD AUTO: 5.15 10*6/MM3 (ref 4.14–5.8)
RBC #/AREA URNS HPF: NORMAL /HPF (ref 0–2)
SODIUM SERPL-SCNC: 141 MMOL/L (ref 136–145)
SP GR UR STRIP: 1.02 (ref 1–1.03)
TRIGL SERPL-MCNC: 88 MG/DL (ref 0–150)
TSH SERPL DL<=0.005 MIU/L-ACNC: 1.88 UIU/ML (ref 0.27–4.2)
URINALYSIS REFLEX: NORMAL
UROBILINOGEN UR STRIP-MCNC: 0.2 MG/DL (ref 0.2–1)
VLDLC SERPL CALC-MCNC: 16 MG/DL (ref 5–40)
WBC # BLD AUTO: 3.95 10*3/MM3 (ref 3.4–10.8)
WBC #/AREA URNS HPF: NORMAL /HPF (ref 0–5)

## 2025-01-22 ENCOUNTER — OFFICE VISIT (OUTPATIENT)
Dept: INTERNAL MEDICINE | Facility: CLINIC | Age: 55
End: 2025-01-22
Payer: COMMERCIAL

## 2025-01-22 VITALS
RESPIRATION RATE: 16 BRPM | WEIGHT: 211 LBS | SYSTOLIC BLOOD PRESSURE: 128 MMHG | BODY MASS INDEX: 30.21 KG/M2 | HEART RATE: 85 BPM | DIASTOLIC BLOOD PRESSURE: 80 MMHG | OXYGEN SATURATION: 98 % | HEIGHT: 70 IN

## 2025-01-22 DIAGNOSIS — Z80.0 FAMILY HX OF COLON CANCER: ICD-10-CM

## 2025-01-22 DIAGNOSIS — K76.0 FATTY LIVER: ICD-10-CM

## 2025-01-22 DIAGNOSIS — I10 PRIMARY HYPERTENSION: ICD-10-CM

## 2025-01-22 DIAGNOSIS — Z00.01 ENCOUNTER FOR GENERAL ADULT MEDICAL EXAMINATION WITH ABNORMAL FINDINGS: Primary | ICD-10-CM

## 2025-01-22 DIAGNOSIS — M10.9 PODAGRA: ICD-10-CM

## 2025-01-22 DIAGNOSIS — G47.36 HYPOXEMIA ASSOCIATED WITH SLEEP: ICD-10-CM

## 2025-01-22 DIAGNOSIS — R42 DIZZINESS: ICD-10-CM

## 2025-01-22 DIAGNOSIS — Z87.898 HISTORY OF VERTIGO: ICD-10-CM

## 2025-01-22 DIAGNOSIS — R73.01 IFG (IMPAIRED FASTING GLUCOSE): ICD-10-CM

## 2025-01-22 DIAGNOSIS — G47.33 OSA ON CPAP: ICD-10-CM

## 2025-01-22 DIAGNOSIS — Z87.01 HISTORY OF PNEUMONIA: ICD-10-CM

## 2025-01-22 DIAGNOSIS — K64.4 EXTERNAL HEMORRHOIDS: ICD-10-CM

## 2025-01-22 DIAGNOSIS — E55.9 VITAMIN D DEFICIENCY: ICD-10-CM

## 2025-01-22 DIAGNOSIS — Z15.09 LYNCH SYNDROME: ICD-10-CM

## 2025-01-22 DIAGNOSIS — M25.561 ACUTE PAIN OF BOTH KNEES: ICD-10-CM

## 2025-01-22 DIAGNOSIS — Z23 NEEDS FLU SHOT: ICD-10-CM

## 2025-01-22 DIAGNOSIS — M25.562 ACUTE PAIN OF BOTH KNEES: ICD-10-CM

## 2025-01-22 DIAGNOSIS — Z23 NEED FOR SHINGLES VACCINE: ICD-10-CM

## 2025-01-22 DIAGNOSIS — Z91.09 ENVIRONMENTAL ALLERGIES: ICD-10-CM

## 2025-01-22 DIAGNOSIS — Z12.11 ENCOUNTER FOR SCREENING FOR MALIGNANT NEOPLASM OF COLON: ICD-10-CM

## 2025-01-22 PROBLEM — N20.1 LEFT URETERAL STONE: Status: RESOLVED | Noted: 2022-08-03 | Resolved: 2025-01-22

## 2025-01-22 PROCEDURE — 99396 PREV VISIT EST AGE 40-64: CPT | Performed by: INTERNAL MEDICINE

## 2025-01-22 PROCEDURE — 90471 IMMUNIZATION ADMIN: CPT | Performed by: INTERNAL MEDICINE

## 2025-01-22 PROCEDURE — 99214 OFFICE O/P EST MOD 30 MIN: CPT | Performed by: INTERNAL MEDICINE

## 2025-01-22 PROCEDURE — 90750 HZV VACC RECOMBINANT IM: CPT | Performed by: INTERNAL MEDICINE

## 2025-01-22 PROCEDURE — 90656 IIV3 VACC NO PRSV 0.5 ML IM: CPT | Performed by: INTERNAL MEDICINE

## 2025-01-22 PROCEDURE — 90472 IMMUNIZATION ADMIN EACH ADD: CPT | Performed by: INTERNAL MEDICINE

## 2025-01-22 NOTE — PROGRESS NOTES
"Annual Exam      HPI  Omar Ha is a 54 y.o. male RTC in yearly CPE, review of medical issues:   1.  HTN, dx'd after persistent blood pressure elevation in office at urology - controlled on increased lisinopril to 30mg daily. NO side effects noted.   2. MARGOT  - new dx in 2022. Compliant on CPAP nightly. But notes 'I dont love it'.  Had pressures increased and tolerating overall well. PT notes biggest issues for him is keeping sleeping pattern in check. Still wakes tired. Feels like getting enough sleep, getting 7-7.5 hours/night.    3. B knee pain - started over year with pickleball.   Saw ortho, Dr. Farah, and told no OA. Placed on meloxicam for last 2 months and 'knees feel like they are 18 again'. Looking into 'less inflammatory diet'.      4. Recurrent nephrolithiasis - s/p CYSTOSCOPY, BILATERAL RETROGRADE PYELOGRAM, LEFT URETEROSCOPY LASER LITHOTRIPSY, STENT PLACEMENT 8/3/22.   Two remnant stones on watchful waiting.  Due for urology appointment, patient to schedule.  UA clear on labs today.    5. Hx of L great toe MTP pain c/w gout - resolved with Colchicine at prior visit. No recurrence since. \"Just the one time\".  6. Vitamin D deficiency - on 1000 I.U. Vitamin D3 OTC daily.   7. Environmental Alleriges, no prior testing  - Claritin PRN.    8. External hemorrhoids - RACIEL, no bleeding or pain.  \"I cannot member the last time we had event\" still on fiber supplement.   9. HM -needs flu and shingles vaccine, declines COVID update at this point      Review of Systems   Constitutional: Negative for chills, fever and weight loss.   HENT:  Negative for congestion, hearing loss, odynophagia and sore throat.    Eyes:  Negative for discharge, double vision, pain and redness.        Last eye exam ~10/2024; no issues     Cardiovascular:  Negative for chest pain, dyspnea on exertion, irregular heartbeat, leg swelling, near-syncope, palpitations and syncope.   Respiratory:  Negative for cough, shortness of breath, sputum " "production and wheezing.         Resolved sx after PNA.  Thought had CXR f/u, but not sure.      Endocrine: Negative for polydipsia, polyphagia and polyuria.   Hematologic/Lymphatic: Negative for bleeding problem. Does not bruise/bleed easily.   Skin:  Negative for rash and suspicious lesions.   Musculoskeletal:  Positive for joint pain (B knees, started with pickleball. Saw ortho andplaced on Meloxicam.). Negative for arthritis (no hx of noted.), falls, joint swelling, muscle cramps, muscle weakness and myalgias.   Gastrointestinal:  Negative for constipation, diarrhea, dysphagia, heartburn, nausea and vomiting.   Genitourinary:  Negative for bladder incontinence, dysuria, frequency, hematuria, hesitancy and incomplete emptying.   Neurological:  Positive for light-headedness (occurs intermittently, more often with driving.No progression. \"I need to do something\".). Negative for dizziness, headaches, loss of balance and vertigo (hx of, no recurrence).   Psychiatric/Behavioral:  Negative for altered mental status and depression. The patient does not have insomnia and is not nervous/anxious.    Allergic/Immunologic: Positive for environmental allergies. Negative for persistent infections.       Problem List:    Patient Active Problem List   Diagnosis    Family hx of colon cancer    Lindquist syndrome    Vitamin D deficiency    Environmental allergies    External hemorrhoids    IFG (impaired fasting glucose)    DDD (degenerative disc disease), cervical    Fatty liver    MARGOT on auto CPAP    Primary hypertension    Podagra    Hypoxemia associated with sleep       Medical History:    Past Medical History:   Diagnosis Date    Allergic Possibly penicillin    Cholelithiasis 2/2022    small stone detected    Chronic bilateral low back pain without sciatica 12/04/2017    Colon polyp     a few polyps have been removed from my colon and stomach    Environmental allergies 12/16/2019    External hemorrhoids 12/16/2019    Hx bleeding " in 2019    Gastric polyps 2020    History of colon polyps     History of medical problems very slight dizziness    I have very mild Vertigo, but rarely have an episode.  The lightheadedness is very minimal but seems to have been going on since I started taking  Linsinopril.    Hypertension     Hypoxemia associated with sleep     Kidney stone on left side 07/2022    Left ureteral stone 08/03/2022    S/p retrieval 8/2022.       Nephrolithiasis 2007    unknown type of stone    MARGOT on auto CPAP 04/14/2022    Home sleep study.  Weight 209 pounds.  Moderate MARGOT with AHI 20.7 events per hour.  When supine, severe at 45.6 events per hour.  On his right side, mild at 7.2 events per hour.  Low O2 saturation 75% And sleep-related hypoxia present for 24.3 minutes.    Vitamin D deficiency 12/11/2018        Social History:    Social History     Socioeconomic History    Marital status:     Number of children: 3   Tobacco Use    Smoking status: Never     Passive exposure: Past    Smokeless tobacco: Never    Tobacco comments:     I never smoked but my parents smoked, and 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   Vaping Use    Vaping status: Never Used   Substance and Sexual Activity    Alcohol use: Yes     Alcohol/week: 2.0 standard drinks of alcohol     Types: 2 Cans of beer per week     Comment: 2-8 drinks/ month    Drug use: No     Comment: tried THC for sleep, did not help, stopped in 2023    Sexual activity: Yes     Partners: Female     Birth control/protection: Vasectomy     Comment: wife only; no hx STD's       Family History:   Family History   Problem Relation Age of Onset    Mitral valve prolapse Mother     Uterine cancer Mother     Colon cancer Mother         Lindquist Syndrome    Hypertension Mother     Hypothyroidism Mother     Melanoma Mother     Squamous cell carcinoma Mother     Arthritis Mother     Cancer Mother         Many different types of cancer    Thyroid disease Mother         Underactive thyroid     Leukemia Father         CLL    Skin cancer Father     Bell's palsy Father     Hypertension Father     Hyperlipidemia Father     Heart failure Father     Heart disease Father         CABG x 4    Diabetes Father     Skin cancer Sister     Colon cancer Brother         Lindquist Syndrome    Cancer Brother         Colon at 48ish years old    Learning disabilities Daughter         Dyslexia    No Known Problems Daughter     No Known Problems Son     Diabetes Maternal Grandfather     Early death Maternal Grandmother         Breast cancer    Malig Hyperthermia Neg Hx        Surgical History:   Past Surgical History:   Procedure Laterality Date    COLONOSCOPY  2020    COLONOSCOPY  12/01/2022    ENDOSCOPY  2020    ENDOSCOPY  12/01/2022    FRACTURE SURGERY  1986    Broken knee    PATELLA FRACTURE SURGERY Right 1986    URETEROSCOPY LASER LITHOTRIPSY WITH STENT INSERTION Left 08/03/2022    Procedure: CYSTOSCOPY, BILATERAL RETROGRADE PYELOGRAM, LEFT URETEROSCOPY LASER LITHOTRIPSY, STENT PLACEMENT;  Surgeon: Ryder Sanchez MD;  Location: [facility];  Service: Urology;  Laterality: Left;    VASECTOMY  2005         Current Outpatient Medications:     cholecalciferol (VITAMIN D3) 25 MCG (1000 UT) tablet, Take 1 tablet by mouth Daily., Disp:  , Rfl:     lisinopril (PRINIVIL,ZESTRIL) 30 MG tablet, TAKE 1 TABLET BY MOUTH DAILY, Disp: 90 tablet, Rfl: 2    MELOXICAM PO, Take  by mouth., Disp: , Rfl:     psyllium (METAMUCIL) 58.6 % packet, Take 1 packet by mouth Daily., Disp: , Rfl:     Vitals:    01/22/25 1001   BP: 128/80   Pulse: 85   Resp: 16   SpO2: 98%     Body mass index is 30.28 kg/m².    Physical Exam  Vitals reviewed.   Constitutional:       General: He is not in acute distress.     Appearance: Normal appearance. He is well-developed. He is obese. He is not ill-appearing or toxic-appearing.   HENT:      Head: Normocephalic and atraumatic.      Right Ear: Hearing, tympanic membrane, ear canal and external ear normal. There is  no impacted cerumen.      Left Ear: Hearing, tympanic membrane, ear canal and external ear normal. There is no impacted cerumen.      Nose: Nose normal.      Mouth/Throat:      Mouth: Mucous membranes are moist. No oral lesions.      Tongue: No lesions.      Pharynx: Oropharynx is clear. Uvula midline. No pharyngeal swelling, oropharyngeal exudate, posterior oropharyngeal erythema or uvula swelling.   Eyes:      General: Lids are normal. No scleral icterus.        Right eye: No discharge.         Left eye: No discharge.      Extraocular Movements: Extraocular movements intact.      Conjunctiva/sclera: Conjunctivae normal.      Pupils: Pupils are equal, round, and reactive to light.   Neck:      Thyroid: No thyroid mass or thyromegaly.      Vascular: No carotid bruit.   Cardiovascular:      Rate and Rhythm: Normal rate and regular rhythm.      Pulses:           Radial pulses are 2+ on the right side and 2+ on the left side.        Dorsalis pedis pulses are 2+ on the right side and 2+ on the left side.        Posterior tibial pulses are 2+ on the right side and 2+ on the left side.      Heart sounds: Normal heart sounds, S1 normal and S2 normal. No murmur heard.     No friction rub. No gallop.   Pulmonary:      Effort: Pulmonary effort is normal. No respiratory distress.      Breath sounds: Normal breath sounds. No wheezing, rhonchi or rales.   Abdominal:      General: Bowel sounds are normal. There is no distension.      Palpations: Abdomen is soft. There is no mass.      Tenderness: There is no abdominal tenderness. There is no guarding or rebound.   Genitourinary:     Prostate: Normal. Not enlarged, not tender and no nodules present.      Rectum: External hemorrhoid (small, non-thrombosed) present. Normal anal tone.   Musculoskeletal:         General: No deformity. Normal range of motion.      Right shoulder: No tenderness, bony tenderness or crepitus. Normal range of motion.      Left shoulder: No tenderness, bony  tenderness or crepitus. Normal range of motion.      Right hand: No tenderness or bony tenderness. Normal range of motion. Normal strength.      Left hand: No tenderness or bony tenderness. Normal range of motion. Normal strength.      Cervical back: Full passive range of motion without pain, normal range of motion and neck supple.      Right lower leg: No edema.      Left lower leg: No edema.   Lymphadenopathy:      Cervical: No cervical adenopathy.      Right cervical: No superficial, deep or posterior cervical adenopathy.     Left cervical: No superficial, deep or posterior cervical adenopathy.      Upper Body:      Right upper body: No supraclavicular, axillary or pectoral adenopathy.      Left upper body: No supraclavicular, axillary or pectoral adenopathy.   Skin:     General: Skin is warm and dry.      Findings: No rash.   Neurological:      Mental Status: He is alert and oriented to person, place, and time.      Cranial Nerves: No cranial nerve deficit, dysarthria or facial asymmetry.      Sensory: No sensory deficit.      Motor: No weakness, tremor, atrophy or abnormal muscle tone.      Gait: Gait normal.      Deep Tendon Reflexes: Reflexes are normal and symmetric.      Reflex Scores:       Patellar reflexes are 2+ on the right side and 2+ on the left side.       Achilles reflexes are 2+ on the right side and 2+ on the left side.     Comments: Negative [place]-Hallpike bilaterally     Psychiatric:         Attention and Perception: Attention normal.         Mood and Affect: Mood normal.         Speech: Speech normal.         Behavior: Behavior normal. Behavior is cooperative.         Thought Content: Thought content normal.       XR chest:F/U pneumonia from 8/2024; no prior for comparison available, unable to view x-ray images from 8/2024  Lungs clear, no acute airspace disease  No mass   No effusions  No cardiomegaly    Assessment/ Plan  Diagnoses and all orders for this visit:    Encounter for general adult  medical examination with abnormal findings    Primary hypertension    IFG (impaired fasting glucose)    MARGOT on auto CPAP    Vitamin D deficiency    Podagra    Lindquist syndrome    Hypoxemia associated with sleep    Fatty liver    Family hx of colon cancer    External hemorrhoids    Environmental allergies    Need for shingles vaccine  -     Shingrix Vaccine    Needs flu shot  -     Fluzone >6mos    Dizziness  -     Ambulatory Referral to Physical Therapy for Evaluation & Treatment    History of vertigo  -     Ambulatory Referral to Physical Therapy for Evaluation & Treatment    Acute pain of both knees    History of pneumonia  -     XR Chest PA & Lateral    Other orders  -     MELOXICAM PO; Take  by mouth.        Return in about 6 months (around 7/22/2025) for Recheck.      Discussion:  Omar Ha is a 54 y.o. male RTC in yearly CPE, review of medical issues:   1.  HTN, dx'd after persistent blood pressure elevation in office at urology - controlled on increased lisinopril to 30mg daily.  BMP OK.  2. MARGOT  - new dx in 2022. Increase pressures in 8/2024. Compliant on CPAP nightly.  Prevnar 20 UTD. F/U pulmonary as planned for pressure check.   3. B knee pain -QOL issue over a year, triggered by pickleball.  S/p orthopedic evaluation Dr. Farah 11/2024 with Dx chondromalacia and quadriceps tendinitis, no OA on exam.  Responded to daily meloxicam course with resolved SX.  Short interval follow-up planned in 2 weeks.  Counseled patient length today on long-term risks of daily NSAID use and D/W patient that this will not be feasible long-term strategy.  However, suspect will D/C medication after Ortho appointment as this likely short-term Tx strategy.  Reviewed with patient options for Tylenol and Voltaren gel (topical NSAID) along with improved safety profile for longer term use.  May need to consider PT, given Dx, if has recurrent SX.   4. Recurrent nephrolithiasis - s/p CYSTOSCOPY, BILATERAL RETROGRADE PYELOGRAM, LEFT  "URETEROSCOPY LASER LITHOTRIPSY, STENT PLACEMENT 8/3/22 - two remnant stones on watchful waiting, per urology.  UA clear on labs today.    5. Hx of L great toe MTP pain c/w gout - resolved with Colchicine.  No recurrence. Uric acid mild elevation in past, but hold on allopurinol initiation given single event and noted TLC modifications by patient over the long-term.  6. FHx of colon cancer, dx of Lindquist Syndrome, dx by Dr. Villasenor in genetics - C-scope with EGD 7/2020 per Dr. Yen, intestinal metaplasia without dysplasia noted in stomach, no polyps.  Repeat EGD/ C-scope in 12/2022, one polyp removed --> 12/2024 (due), per GI.   Cysto OK with urology 2022.  BARON/PSA OK today.  7. Vitamin D deficiency - replete on 1000 I.U. Vitamin D3 OTC daily. C/W same.   8. Environmental Alleriges, no prior testing  - RACIEL today. Claritin PRN.    9. Fatty liver, US shown/Hx ALT elevation/ IFG  -remains elevated BMI, resolved FBS and A1c is normal today.  Minimal ALT elevation.  D/W patient need for consistent exercise and TLC diet modifications weight loss over the long-term.  Trend.  Weight loss advised.    10. External hemorrhoids - NA. C/W fiber supplement daily.  11. Hx of LLL PNA 8/2024 -resolved with ABX.  Counseled patient on limited coverage of \"pneumonia shot\" that he received in past, patient confused as to how he got pneumonia after shot.  Repeat CXR shows complete clearance today.  12. Hx of vertigo (BPPV) with persistent \"dizziness\", primarily with driving -mentions again today, bothersome and worrisome SX for patient.  Exam nonfocal, [place]-Hallpike(-).  DDx here includes vestibular origin/vertigo VS.  Medication side effect (ACE inhibitor).  Refer to PT for formal vestibular evaluation.  If negative, will plan to try alternate BP medication/D/C lisinopril.  13. HM - labs d/w pt; Flu/ Shingrix #1- today;  Prevnar 20/ Tdap/ Hep A/ COVID - UTD; C-scope/ EGD 12/2022 (1 polyp SSA) --> 2 years, schedule per GI; BARON/ PSA OK today; " Hep C Ab (-) 12/2020; augment exercise for weight loss    RTC 6 months, F labs prior (CMP, A1C)

## 2025-01-22 NOTE — LETTER
[facility]  Vaccine Consent Form    Patient Name:  Omar Ha  Patient :  1970     Vaccine(s) Ordered    Fluzone >6mos  Shingrix Vaccine        Screening Checklist  The following questions should be completed prior to vaccination. If you answer “yes” to any question, it does not necessarily mean you should not be vaccinated. It just means we may need to clarify or ask more questions. If a question is unclear, please ask your healthcare provider to explain it.    Yes No   Any fever or moderate to severe illness today (mild illness and/or antibiotic treatment are not contraindications)?     Do you have a history of a serious reaction to any previous vaccinations, such as anaphylaxis, encephalopathy within 7 days, Guillain-[place] syndrome within 6 weeks, seizure?     Have you received any live vaccine(s) (e.g MMR, WENCESLAO) or any other vaccines in the last month (to ensure duplicate doses aren't given)?     Do you have an anaphylactic allergy to latex (DTaP, DTaP-IPV, Hep A, Hep B, MenB, RV, Td, Tdap), baker’s yeast (Hep B, HPV), polysorbates (RSV, nirsevimab, PCV 20, Rotavirrus, Tdap, Shingrix), or gelatin (WENCESLAO, MMR)?     Do you have an anaphylactic allergy to neomycin (Rabies, WENCESLAO, MMR, IPV, Hep A), polymyxin B (IPV), or streptomycin (IPV)?      Any cancer, leukemia, AIDS, or other immune system disorder? (WENCESLAO, MMR, RV)     Do you have a parent, brother, or sister with an immune system problem (if immune competence of vaccine recipient clinically verified, can proceed)? (MMR, WENCESLAO)     Any recent steroid treatments for >2 weeks, chemotherapy, or radiation treatment? (WENCESLAO, MMR)     Have you received antibody-containing blood transfusions or IVIG in the past 11 months (recommended interval is dependent on product)? (MMR, WENCESLAO)     Have you taken antiviral drugs (acyclovir, famciclovir, valacyclovir for WENCESLAO) in the last 24 or 48 hours, respectively?      Are you pregnant or planning to become pregnant within 1  "month? (WENCESLAO, MMR, HPV, IPV, MenB, Abrexvy; For Hep B- refer to Engerix-B; For RSV - Abrysvo is indicated for 32-36 weeks of pregnancy from September to January)     For infants, have you ever been told your child has had intussusception or a medical emergency involving obstruction of the intestine (Rotavirus)? If not for an infant, can skip this question.         *Ordering Physicians/APC should be consulted if \"yes\" is checked by the patient or guardian above.  I have received, read, and understand the Vaccine Information Statement (VIS) for each vaccine ordered.  I have considered my or my child's health status as well as the health status of my close contacts.  I have taken the opportunity to discuss my vaccine questions with my or my child's health care provider.   I have requested that the ordered vaccine(s) be given to me or my child.  I understand the benefits and risks of the vaccines.  I understand that I should remain in the clinic for 15 minutes after receiving the vaccine(s).  _________________________________________________________  Signature of Patient or Parent/Legal Guardian ____________________  Date   "

## 2025-02-04 ENCOUNTER — PATIENT MESSAGE (OUTPATIENT)
Dept: INTERNAL MEDICINE | Facility: CLINIC | Age: 55
End: 2025-02-04
Payer: COMMERCIAL

## 2025-02-24 ENCOUNTER — OFFICE (OUTPATIENT)
Dept: URBAN - METROPOLITAN AREA PATHOLOGY 4 | Facility: PATHOLOGY | Age: 55
End: 2025-02-24
Payer: COMMERCIAL

## 2025-02-24 ENCOUNTER — AMBULATORY SURGICAL CENTER (OUTPATIENT)
Dept: URBAN - METROPOLITAN AREA SURGERY 20 | Facility: SURGERY | Age: 55
End: 2025-02-24
Payer: COMMERCIAL

## 2025-02-24 DIAGNOSIS — Z12.11 ENCOUNTER FOR SCREENING FOR MALIGNANT NEOPLASM OF COLON: ICD-10-CM

## 2025-02-24 DIAGNOSIS — K31.7 POLYP OF STOMACH AND DUODENUM: ICD-10-CM

## 2025-02-24 DIAGNOSIS — D12.3 BENIGN NEOPLASM OF TRANSVERSE COLON: ICD-10-CM

## 2025-02-24 DIAGNOSIS — K44.9 DIAPHRAGMATIC HERNIA WITHOUT OBSTRUCTION OR GANGRENE: ICD-10-CM

## 2025-02-24 DIAGNOSIS — K64.0 FIRST DEGREE HEMORRHOIDS: ICD-10-CM

## 2025-02-24 DIAGNOSIS — Z15.09 GENETIC SUSCEPTIBILITY TO OTHER MALIGNANT NEOPLASM: ICD-10-CM

## 2025-02-24 DIAGNOSIS — Z80.9 FAMILY HISTORY OF MALIGNANT NEOPLASM, UNSPECIFIED: ICD-10-CM

## 2025-02-24 LAB
GI HISTOLOGY: A. GASTRIC POLYPS: (no result)
GI HISTOLOGY: PDF REPORT: (no result)
Lab: (no result)

## 2025-02-24 PROCEDURE — 43235 EGD DIAGNOSTIC BRUSH WASH: CPT | Performed by: INTERNAL MEDICINE

## 2025-02-24 PROCEDURE — 88305 TISSUE EXAM BY PATHOLOGIST: CPT | Performed by: PATHOLOGY

## 2025-02-24 PROCEDURE — 45380 COLONOSCOPY AND BIOPSY: CPT | Performed by: INTERNAL MEDICINE

## 2025-04-05 ENCOUNTER — TELEPHONE (OUTPATIENT)
Dept: INTERNAL MEDICINE | Facility: CLINIC | Age: 55
End: 2025-04-05
Payer: COMMERCIAL

## 2025-04-05 RX ORDER — COLCHICINE 0.6 MG/1
TABLET ORAL
Qty: 3 TABLET | Refills: 3 | Status: SHIPPED | OUTPATIENT
Start: 2025-04-05

## 2025-04-05 NOTE — TELEPHONE ENCOUNTER
Patient with complaint of gout attack.   Had in past.  Requests Rx to help.  I sent in colchicine.  Advise to take Aleve 2 po BID.  The patient is instructed to let our office know if not feeling better over the next several days or if there is any change in symptoms.

## 2025-04-17 ENCOUNTER — TELEPHONE (OUTPATIENT)
Dept: INTERNAL MEDICINE | Facility: CLINIC | Age: 55
End: 2025-04-17
Payer: COMMERCIAL

## 2025-04-17 NOTE — TELEPHONE ENCOUNTER
Now, needs to be seen.  Colchicine unlikely be effective at this point/subacute state.  Additionally, if has failed to resolve with colchicine plus recommended Aleve (per Dr. Tao's recommendation) then do need to assess patient in the office

## 2025-04-17 NOTE — TELEPHONE ENCOUNTER
Caller: Omar Ha    Relationship: Self    Best call back number: [phone]     What medication are you requesting: EMEKA     What are your current symptoms: GOUT FLARE UP     If a prescription is needed, what is your preferred pharmacy and phone number: [id number] - [place], [place] - [address] AT [address] - [phone]  - [phone] FX     Additional notes:  PATIENT WAS GIVEN THIS MEDICATION A FEW WEEKS AGO AND IS STILL HAVING AN ISSUE WITH THE GOUT. PATIENT WOULD LIKE  REFILL OR DOES HE NEEDS AN APPOINTMENT

## 2025-04-23 ENCOUNTER — OFFICE VISIT (OUTPATIENT)
Dept: INTERNAL MEDICINE | Facility: CLINIC | Age: 55
End: 2025-04-23
Payer: COMMERCIAL

## 2025-04-23 VITALS
SYSTOLIC BLOOD PRESSURE: 120 MMHG | BODY MASS INDEX: 30.21 KG/M2 | HEART RATE: 75 BPM | WEIGHT: 211 LBS | HEIGHT: 70 IN | OXYGEN SATURATION: 98 % | DIASTOLIC BLOOD PRESSURE: 78 MMHG

## 2025-04-23 DIAGNOSIS — M10.9 ACUTE GOUT OF LEFT FOOT, UNSPECIFIED CAUSE: Primary | ICD-10-CM

## 2025-04-23 PROCEDURE — 99214 OFFICE O/P EST MOD 30 MIN: CPT | Performed by: INTERNAL MEDICINE

## 2025-04-23 RX ORDER — METHYLPREDNISOLONE 4 MG/1
TABLET ORAL
Qty: 21 TABLET | Refills: 0 | Status: SHIPPED | OUTPATIENT
Start: 2025-04-23

## 2025-04-23 RX ORDER — COLCHICINE 0.6 MG/1
TABLET ORAL
Qty: 33 TABLET | Refills: 0 | Status: SHIPPED | OUTPATIENT
Start: 2025-04-23

## 2025-04-23 NOTE — PROGRESS NOTES
Subjective     Omar Ha is a 54 y.o. male who presents with   Chief Complaint   Patient presents with    Foot Pain       History of Present Illness     Left great toe pain and swelling.  Pain and swelling is severe.  Going on three weeks.  Colchine was helpful but pain returned.      Review of Systems   Constitutional:  Negative for fever.       The following portions of the patient's history were reviewed and updated as appropriate: allergies, current medications and problem list.    Patient Active Problem List    Diagnosis Date Noted    Hypoxemia associated with sleep 06/10/2023    Podagra 01/16/2023     Note Last Updated: 1/16/2023 2021; Uric acid mild elevation but pt has made TLC mods and no recurrence of sx      Primary hypertension 08/12/2022     Note Last Updated: 8/12/2022     New dx in 2022 during eval for nephrolithiasis with persistent elevated pressures in urology office.         MARGOT on auto CPAP 04/14/2022     Note Last Updated: 6/10/2023     Home sleep study.  Weight 209 pounds.  Moderate MARGOT with AHI 20.7 events per hour.  When supine, severe at 45.6 events per hour.  On his right side, mild at 7.2 events per hour.  Low O2 saturation 75% And sleep-related hypoxia present for 24.3 minutes.      Fatty liver 12/31/2020     Note Last Updated: 6/18/2021     Noted on 12/2020 U/S, elevated ALT; resolved ALT in 6/2021 with TLC mods and weight loss.       IFG (impaired fasting glucose) 12/18/2020     Note Last Updated: 6/18/2021     Resolved with TLC mods in 6/2021.      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 Villasenor in genetics      Vitamin D deficiency 12/11/2018    Family hx of colon cancer 07/24/2017  "    Note Last Updated: 12/4/2017     Overview:   Added automatically from request for surgery [id number]         Current Outpatient Medications on File Prior to Visit   Medication Sig Dispense Refill    cholecalciferol (VITAMIN D3) 25 MCG (1000 UT) tablet Take 1 tablet by mouth Daily.      lisinopril (PRINIVIL,ZESTRIL) 30 MG tablet TAKE 1 TABLET BY MOUTH DAILY 90 tablet 2    MELOXICAM PO Take  by mouth.      psyllium (METAMUCIL) 58.6 % packet Take 1 packet by mouth Daily.      [DISCONTINUED] colchicine 0.6 MG tablet Take two tablets by mouth now and one more in one hour as directed. 3 tablet 3     No current facility-administered medications on file prior to visit.       Objective     /78   Pulse 75   Ht 177.8 cm (70\")   Wt 95.7 kg (211 lb)   SpO2 98%   BMI 30.28 kg/m²     Physical Exam  Constitutional:       Appearance: He is well-developed.   HENT:      Head: Atraumatic.   Pulmonary:      Effort: Pulmonary effort is normal.   Feet:      Right foot:      Skin integrity: Erythema and warmth present.      Comments: Right 1st MTP joint is warm, swollen, red and tender.    Neurological:      Mental Status: He is alert and oriented to person, place, and time.         Assessment & Plan   Diagnoses and all orders for this visit:    1. Acute gout of left foot, unspecified cause (Primary)  -     CBC & Differential  -     Comprehensive Metabolic Panel  -     Uric acid    Other orders  -     methylPREDNISolone (MEDROL) 4 MG dose pack; Take as directed on package instructions.  Dispense: 21 tablet; Refill: 0  -     colchicine 0.6 MG tablet; Take two tablets by mouth now and one more in one hour as directed.  Then start one pill daily.  Dispense: 33 tablet; Refill: 0        Discussion    Patient presents with an acute gout attack.  Rx for medrol dose mary and colchicine given.  Handout about gout provided today.  Principles of a gout diet discussed.  Let me know if not feeling better over the next 7 days or if there is " any change in symptoms.  Check labs today.  Consider addition of allopurinol but I will leave that discussion to his primary care physician.           Future Appointments   Date Time Provider Department Center   5/8/2025 11:00 AM Andres Grayson MD [facility]   6/4/2025  8:30 AM Boston Olivo MD [facility] None   7/22/2025  8:45 AM Andres Grayson MD [facility]   1/26/2026  8:30 AM [facility]   1/29/2026 11:15 AM Andres Grayson MD [facility]

## 2025-05-02 ENCOUNTER — TELEPHONE (OUTPATIENT)
Dept: INTERNAL MEDICINE | Facility: CLINIC | Age: 55
End: 2025-05-02
Payer: COMMERCIAL

## 2025-05-02 NOTE — TELEPHONE ENCOUNTER
I got the patient in sooner than thurs so that he didn't have to wait that long. Did you want to send another dosage of what Aislinn gave him until Tuesday?

## 2025-05-02 NOTE — TELEPHONE ENCOUNTER
Patient should have completed steroid dosing as well as colchicine.  Will see patient in the office upcoming to reassess.

## 2025-05-02 NOTE — TELEPHONE ENCOUNTER
Caller: Omar Ha    Relationship: Self    Best call back number: [phone]     What medication are you requesting: STRONGER MEDICATION     What are your current symptoms: ONGOING GOUT FLARE UP     How long have you been experiencing symptoms: 5 WEEKS     Have you had these symptoms before:    [] Yes  [x] No    Have you been treated for these symptoms before:   [] Yes  [x] No    If a prescription is needed, what is your preferred pharmacy and phone number: [id number] - [place] [address] AT [facility] [address] - [phone]  - [phone] FX     Additional notes:

## 2025-05-06 ENCOUNTER — OFFICE VISIT (OUTPATIENT)
Dept: INTERNAL MEDICINE | Facility: CLINIC | Age: 55
End: 2025-05-06
Payer: COMMERCIAL

## 2025-05-06 VITALS
TEMPERATURE: 97.5 F | DIASTOLIC BLOOD PRESSURE: 70 MMHG | SYSTOLIC BLOOD PRESSURE: 100 MMHG | HEART RATE: 65 BPM | BODY MASS INDEX: 30.24 KG/M2 | HEIGHT: 70 IN | OXYGEN SATURATION: 95 % | WEIGHT: 211.2 LBS

## 2025-05-06 DIAGNOSIS — M10.9 PODAGRA: Primary | ICD-10-CM

## 2025-05-06 DIAGNOSIS — M10.9 POLYARTICULAR GOUT: ICD-10-CM

## 2025-05-06 PROBLEM — M21.612 BUNION OF GREAT TOE OF LEFT FOOT: Status: ACTIVE | Noted: 2025-05-06

## 2025-05-06 LAB
ALBUMIN SERPL-MCNC: 4.5 G/DL (ref 3.5–5.2)
ALBUMIN/GLOB SERPL: 2 G/DL
ALP SERPL-CCNC: 58 U/L (ref 39–117)
ALT SERPL-CCNC: 21 U/L (ref 1–41)
AST SERPL-CCNC: 18 U/L (ref 1–40)
BASOPHILS # BLD AUTO: 0.06 10*3/MM3 (ref 0–0.2)
BASOPHILS NFR BLD AUTO: 1.1 % (ref 0–1.5)
BILIRUB SERPL-MCNC: 0.7 MG/DL (ref 0–1.2)
BUN SERPL-MCNC: 18 MG/DL (ref 6–20)
BUN/CREAT SERPL: 18.4 (ref 7–25)
CALCIUM SERPL-MCNC: 9.9 MG/DL (ref 8.6–10.5)
CHLORIDE SERPL-SCNC: 99 MMOL/L (ref 98–107)
CO2 SERPL-SCNC: 26.7 MMOL/L (ref 22–29)
CREAT SERPL-MCNC: 0.98 MG/DL (ref 0.76–1.27)
EGFRCR SERPLBLD CKD-EPI 2021: 90.5 ML/MIN/1.73
EOSINOPHIL # BLD AUTO: 0.18 10*3/MM3 (ref 0–0.4)
EOSINOPHIL NFR BLD AUTO: 3.4 % (ref 0.3–6.2)
ERYTHROCYTE [DISTWIDTH] IN BLOOD BY AUTOMATED COUNT: 12.4 % (ref 12.3–15.4)
GLOBULIN SER CALC-MCNC: 2.3 GM/DL
GLUCOSE SERPL-MCNC: 96 MG/DL (ref 65–99)
HCT VFR BLD AUTO: 44.7 % (ref 37.5–51)
HGB BLD-MCNC: 15.5 G/DL (ref 13–17.7)
IMM GRANULOCYTES # BLD AUTO: 0.02 10*3/MM3 (ref 0–0.05)
IMM GRANULOCYTES NFR BLD AUTO: 0.4 % (ref 0–0.5)
LYMPHOCYTES # BLD AUTO: 1.08 10*3/MM3 (ref 0.7–3.1)
LYMPHOCYTES NFR BLD AUTO: 20.5 % (ref 19.6–45.3)
MCH RBC QN AUTO: 30.3 PG (ref 26.6–33)
MCHC RBC AUTO-ENTMCNC: 34.7 G/DL (ref 31.5–35.7)
MCV RBC AUTO: 87.3 FL (ref 79–97)
MONOCYTES # BLD AUTO: 0.52 10*3/MM3 (ref 0.1–0.9)
MONOCYTES NFR BLD AUTO: 9.9 % (ref 5–12)
NEUTROPHILS # BLD AUTO: 3.4 10*3/MM3 (ref 1.7–7)
NEUTROPHILS NFR BLD AUTO: 64.7 % (ref 42.7–76)
NRBC BLD AUTO-RTO: 0 /100 WBC (ref 0–0.2)
PLATELET # BLD AUTO: 264 10*3/MM3 (ref 140–450)
POTASSIUM SERPL-SCNC: 4.9 MMOL/L (ref 3.5–5.2)
PROT SERPL-MCNC: 6.8 G/DL (ref 6–8.5)
RBC # BLD AUTO: 5.12 10*6/MM3 (ref 4.14–5.8)
SODIUM SERPL-SCNC: 137 MMOL/L (ref 136–145)
URATE SERPL-MCNC: 7.2 MG/DL (ref 3.4–7)
WBC # BLD AUTO: 5.26 10*3/MM3 (ref 3.4–10.8)

## 2025-05-06 PROCEDURE — 99214 OFFICE O/P EST MOD 30 MIN: CPT | Performed by: INTERNAL MEDICINE

## 2025-05-06 RX ORDER — ALLOPURINOL 100 MG/1
100 TABLET ORAL DAILY
Qty: 90 TABLET | Refills: 3 | Status: CANCELLED | OUTPATIENT
Start: 2025-05-06

## 2025-05-06 RX ORDER — PREDNISONE 20 MG/1
20 TABLET ORAL DAILY
Qty: 5 TABLET | Refills: 0 | Status: SHIPPED | OUTPATIENT
Start: 2025-05-06 | End: 2025-05-11

## 2025-05-06 RX ORDER — COLCHICINE 0.6 MG/1
0.6 TABLET ORAL DAILY
Qty: 30 TABLET | Refills: 5 | Status: CANCELLED | OUTPATIENT
Start: 2025-05-06 | End: 2025-11-02

## 2025-05-06 RX ORDER — MELOXICAM 7.5 MG/1
7.5 TABLET ORAL DAILY
COMMUNITY
Start: 2025-05-02

## 2025-05-06 NOTE — PROGRESS NOTES
"Gout      HPI  Omar Ha is a 54 y.o. male RTC in acute care:  Started ~ 5 weeks ago with toe pain, starting with some stereotyped pain in L great toe over MTP joint.  Started to swell over a few days and pain really progressed to point 'could not put a bed sheet over foot.  Progressed up foot and where had some pain and swelling more diffusely over foot up to ankle. Entire foot hurt and was tender to touch at that time. Pt suffered through it for ~ 10 days and then called office and given colchicine initially over phone but did not rest foot and pain persisted. Seen by partner MD several days later and due to persistent pain and given additional colchicine and steroid dose pack.  Helped a bit but still persisted with pain and was really not resting, still working with son at his house day and night.    Started on some hydrocodone and then some online remedies with baking soda/ apple cider vinegar/ bananas.  Concurrently was able to rest foot and elevate.   Is really averse to taking daily medication.  Recalls, again today, his father who was on \"30 medications when he \" and feels that that made an impression on him.  Not willing to start allopurinol today.     In the last year has cut out sugars and lost about 25#.    Has meloxicam Rx at home but uses very rarely, not using regularly with recent gout flare    Recalls only had one additional event of gout about 5 years ago.         Review of Systems   Constitutional: Negative for chills and fever.   Skin:  Negative for rash and suspicious lesions.   Musculoskeletal:  Positive for gout, joint pain (L great toe) and joint swelling (L great toe). Negative for falls.       The following portions of the patient's history were reviewed and updated as appropriate: allergies, current medications, past medical history, past social history, and problem list.      Current Outpatient Medications:     cholecalciferol (VITAMIN D3) 25 MCG (1000 UT) tablet, Take 1 tablet by " "mouth Daily., Disp:  , Rfl:     lisinopril (PRINIVIL,ZESTRIL) 30 MG tablet, TAKE 1 TABLET BY MOUTH DAILY, Disp: 90 tablet, Rfl: 2    meloxicam (MOBIC) 7.5 MG tablet, Take 1 tablet by mouth Daily., Disp: , Rfl:     psyllium (METAMUCIL) 58.6 % packet, Take 1 packet by mouth Daily., Disp: , Rfl:     predniSONE (DELTASONE) 20 MG tablet, Take 1 tablet by mouth Daily for 5 days., Disp: 5 tablet, Rfl: 0    Vitals:    05/06/25 0808   BP: 100/70   BP Location: Left arm   Patient Position: Sitting   Cuff Size: Adult   Pulse: 65   Temp: 97.5 °F (36.4 °C)   TempSrc: Infrared   SpO2: 95%   Weight: 95.8 kg (211 lb 3.2 oz)   Height: 177.8 cm (70\")     Body mass index is 30.3 kg/m².      Physical Exam  Vitals reviewed.   Constitutional:       General: He is not in acute distress.     Appearance: He is well-developed. He is not ill-appearing or toxic-appearing.   HENT:      Head: Normocephalic.   Cardiovascular:      Pulses:           Dorsalis pedis pulses are 2+ on the left side.        Posterior tibial pulses are 2+ on the left side.   Pulmonary:      Effort: Pulmonary effort is normal. No respiratory distress.   Musculoskeletal:      Right lower leg: No edema.      Left lower leg: No edema.      Right foot: Normal range of motion. No deformity or bunion.      Left foot: Normal range of motion. Swelling (mild over great toe MTP), bunion and tenderness (over first MTP) present. No bony tenderness or crepitus. Normal pulse.   Feet:      Left foot:      Skin integrity: Erythema (mild, diffuse over great toe MTP with noted curcumferential desquamation at MTP joint) present. No ulcer, blister or skin breakdown.   Neurological:      Mental Status: He is alert and oriented to person, place, and time.   Psychiatric:         Behavior: Behavior normal.         Thought Content: Thought content normal.       XR L foot; Gout/ podagra; no prior for comparison available  No fracture noted  Mild hallux valgus of great toe  Soft tissue swelling " over the great toe MTP joint  2 small tiny bony hypertrophy lesions at first MTP  No tophi noted    Assessment/ Plan  Diagnoses and all orders for this visit:    Podagra  -     XR Foot 3+ View Left (In Office)  -     predniSONE (DELTASONE) 20 MG tablet; Take 1 tablet by mouth Daily for 5 days.    Polyarticular gout  -     predniSONE (DELTASONE) 20 MG tablet; Take 1 tablet by mouth Daily for 5 days.    Other orders  -     meloxicam (MOBIC) 7.5 MG tablet; Take 1 tablet by mouth Daily.        No follow-ups on file.      Discussion:    Omar Ha is a 54 y.o. male RTC in acute care after recent presumptive Dx of gout/podagra of left foot by partner MD, gout event#2 in lifetime for patient.  Patient recalls multiweek history of intense pain and swelling that he ignored due to busy schedule helping son, with subsequent partial response to Medrol Dosepak and colchicine.  SX have improved, though patient does continue with some great toe pain and presents again today for repeat evaluation.  Exam notable for left great toe bunion deformity and mild TTP over first MTP joint and with passive flexion.  Notable as well for circumferential desquamation over first MTP joint, suggesting resolution of inflammation.  XR today with hallux valgus of great toe, minimal joint space narrowing of first MTP, and overlying soft tissue swelling without definitive tophi.  D/W patient recommendation for initiation of allopurinol with 6 months of daily colchicine PPx during allopurinol initiation, however patient declines preferring to remain off any daily/long-term medication additions.  Check CBC/CMP/uric acid today  Prednisone 20 mg pulsed dose X5 days to hasten podagra resolution of left great toe.  Voltaren gel topically 4 times daily OTC.  Meloxicam use only PRN, very rare use.  Patient has modified diet significantly over the last year and encouraged to C/W avoidance of concentrated sweets/high fructose corn syrup and to avoid excessive  intake of protein volume at meals.  Need to consider joint aspiration if has recurrent flare for more definitive diagnosis given patient's somewhat hesitant approach to gout Tx today.    RTC as planned

## 2025-05-20 ENCOUNTER — CLINICAL SUPPORT (OUTPATIENT)
Dept: INTERNAL MEDICINE | Facility: CLINIC | Age: 55
End: 2025-05-20
Payer: COMMERCIAL

## 2025-05-20 DIAGNOSIS — Z23 NEED FOR SHINGLES VACCINE: Primary | ICD-10-CM

## 2025-05-21 ENCOUNTER — PATIENT MESSAGE (OUTPATIENT)
Dept: INTERNAL MEDICINE | Facility: CLINIC | Age: 55
End: 2025-05-21
Payer: COMMERCIAL

## 2025-06-04 ENCOUNTER — OFFICE VISIT (OUTPATIENT)
Dept: SLEEP MEDICINE | Facility: HOSPITAL | Age: 55
End: 2025-06-04
Payer: COMMERCIAL

## 2025-06-04 VITALS — HEIGHT: 70 IN | WEIGHT: 191 LBS | OXYGEN SATURATION: 97 % | BODY MASS INDEX: 27.35 KG/M2 | HEART RATE: 80 BPM

## 2025-06-04 DIAGNOSIS — G47.33 OSA ON CPAP: Primary | ICD-10-CM

## 2025-06-04 DIAGNOSIS — G47.36 HYPOXEMIA ASSOCIATED WITH SLEEP: ICD-10-CM

## 2025-06-04 PROCEDURE — G0463 HOSPITAL OUTPT CLINIC VISIT: HCPCS

## 2025-06-04 PROCEDURE — 99213 OFFICE O/P EST LOW 20 MIN: CPT | Performed by: INTERNAL MEDICINE

## 2025-06-04 NOTE — PROGRESS NOTES
"[facility]  Follow Up Sleep Disorders Center Note     Chief Complaint:  MARGOT     Primary Care Physician: Andres Grayson MD    Interval History:   The patient is a 54 y.o. male who I last saw 6/4/2024 and that note was reviewed.  The patient reports he is doing well without new complaints.  He has succeeded with weight loss.  He is decreasing his calories and avoiding sugars.  He states he has removed his blood pressure medicine.    The patient goes to bed 11:30 PM gets out of bed at 7 AM.  He will use the restroom during that time.    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   Vaping Use    Vaping status: Never Used   Substance and Sexual Activity    Alcohol use: Yes     Alcohol/week: 2.0 standard drinks of alcohol     Types: 2 Cans of beer per week     Comment: 2-8 drinks/ month    Drug use: No     Comment: tried THC for sleep, did not help, stopped in 2023    Sexual activity: Yes     Partners: Female     Birth control/protection: Vasectomy     Comment: wife only; no hx STD's       Allergies:  Morphine and Penicillins     Medication Review:  Reviewed.      Vital Signs:    Vitals:    06/04/25 0829   Pulse: 80   SpO2: 97%   Weight: 86.6 kg (191 lb)   Height: 177.8 cm (70\")     Body mass index is 27.41 kg/m².    Physical Exam:    Constitutional:  Well developed 54 y.o. male that appears in no apparent distress.  Awake & oriented times 3.  Normal mood with normal recent and remote memory and normal judgement.  Eyes:  Conjunctivae normal.  Oropharynx: Previously, moist mucous membranes without exudate and a large " tongue and class III Mallampati airway    Self-administered [place] Sleepiness Scale test results: 3  0-5 Lower normal daytime sleepiness  6-10 Higher normal daytime sleepiness  11-12 Mild, 13-15 Moderate, & 16-24 Severe excessive daytime sleepiness     Downloaded PAP Data Evaluated For Therapeutic Response and Compliance:  DME is [facility] and the patient uses a fullface mask.  Downloads between 2/28 and 5/28/2025 compliance 94%.  Average usage is 6 hours and 53 minutes.  Average AHI is normal without leak.  Average auto CPAP pressure is 9.4 and his Re[employer] G3 auto CPAP is 8-14    I have reviewed the above results and compared them with the patient's last downloads and reviewed with the patient.    Impression:   Moderate severity obstructive sleep apnea, severe when supine, with sleep-related hypoxia by home sleep study 4/14/2022, weight 209 pounds, adequately treated with Re[employer] G3 auto CPAP.  Downloads demonstrate the patient to be at goal with good compliance and usage.  The patient has no complaints of hypersomnolence.     Plan:  Good sleep hygiene measures should be maintained.  Further weight loss would be beneficial in this patient who is overweight by Body mass index is 27.41 kg/m².  When last seen 6/4/2024 he weighed 210 pounds and today he weighs 191 pounds    After evaluating the patient and assessing results available, the patient is benefiting from the treatment being provided.     The patient will continue Re[employer] G3 auto CPAP for their ongoing obstructive sleep apnea treatment.  Potential side effects of not using PAP therapy reviewed and addressed as needed.  After clinical evaluation and review of downloads, I recommend no changes to the patient's pressures.  A new prescription will be sent to the patient's DME.    I did review that if he loses more weight, pressures may be too high and he is to call.  Additionally, if he continues to lose weight, at some point, repeat home sleep study  should be performed.    I answered all of the patient's questions.  The patient will call the Sleep Disorder Center for any problems and will follow up for obstructive sleep apnea care in 1 year.      Boston Olivo MD  Sleep Medicine  06/04/25  08:32 EDT

## 2025-07-28 ENCOUNTER — OFFICE VISIT (OUTPATIENT)
Dept: INTERNAL MEDICINE | Facility: CLINIC | Age: 55
End: 2025-07-28
Payer: COMMERCIAL

## 2025-07-28 VITALS
HEIGHT: 70 IN | HEART RATE: 67 BPM | TEMPERATURE: 98 F | BODY MASS INDEX: 27.99 KG/M2 | SYSTOLIC BLOOD PRESSURE: 116 MMHG | OXYGEN SATURATION: 98 % | DIASTOLIC BLOOD PRESSURE: 82 MMHG | WEIGHT: 195.5 LBS

## 2025-07-28 DIAGNOSIS — G47.33 OSA ON CPAP: ICD-10-CM

## 2025-07-28 DIAGNOSIS — I10 PRIMARY HYPERTENSION: Primary | ICD-10-CM

## 2025-07-28 DIAGNOSIS — M21.612 BUNION OF GREAT TOE OF LEFT FOOT: ICD-10-CM

## 2025-07-28 DIAGNOSIS — M10.9 PODAGRA: ICD-10-CM

## 2025-07-28 DIAGNOSIS — R73.01 IFG (IMPAIRED FASTING GLUCOSE): ICD-10-CM

## 2025-07-28 DIAGNOSIS — K76.0 FATTY LIVER: ICD-10-CM

## 2025-07-28 PROCEDURE — 99214 OFFICE O/P EST MOD 30 MIN: CPT | Performed by: INTERNAL MEDICINE

## 2025-07-28 NOTE — PROGRESS NOTES
Podagra and IFG (impaired fasting glucose)      HPI  Omar Ha is a 54 y.o. male RTC in f/u:   1. Presumptive Dx of gout/podagra of left foot - has been making diet changes, cut out all added sugar and processed foods.  Feels better overall. Feels like has less aches and pains and is more active.    Did loose ~20# initially. When first made changes cut out bread and all beer. Has added back in some bread and 2-3 beers/ week.   2. HTN, dx'd after persistent blood pressure elevation in office at urology - pressures controlled off lisinopril to 30mg daily after lost weight and made diet changes.  Getting all  numbers < 130/80.   3. MARGOT  - new dx in 2022. Increase pressures in 8/2024. Has been on stable pressures and saw sleep med in 6/2025.  Advised to call if addtiional weightl oss.   4. Fatty liver, US shown/Hx ALT elevation/ IFG  -patient made significant dietary changes and noted weight loss.  Had labs repeated back in May     Answers submitted by the patient for this visit:  Chronic Condition Follow-up (Submitted on 7/28/2025)  Chief Complaint: PCP follow-up  Medication compliance: all of the time  Treatment barriers: no complaince problems  Exercise: three times a week    Review of Systems   Constitutional: Positive for weight loss (intentinoal). Negative for chills and fever.   Respiratory:  Negative for sleep disturbances due to breathing (on CPAP) and snoring.    Musculoskeletal:  Positive for joint pain (minimal). Negative for gout (resolved, no recurrence) and joint swelling.   Neurological:  Negative for dizziness and light-headedness.   Psychiatric/Behavioral:  Negative for altered mental status.        The following portions of the patient's history were reviewed and updated as appropriate: allergies, current medications, past medical history, past social history, and problem list.      Current Outpatient Medications:     cholecalciferol (VITAMIN D3) 25 MCG (1000 UT) tablet, Take 1 tablet by mouth  "Daily., Disp:  , Rfl:     psyllium (METAMUCIL) 58.6 % packet, Take 1 packet by mouth Daily., Disp: , Rfl:     Vitals:    07/28/25 1447   BP: 116/82   BP Location: Left arm   Patient Position: Sitting   Cuff Size: Adult   Pulse: 67   Temp: 98 °F (36.7 °C)   TempSrc: Infrared   SpO2: 98%   Weight: 88.7 kg (195 lb 8 oz)   Height: 177.8 cm (70\")     Body mass index is 28.05 kg/m².      Physical Exam  Vitals reviewed.   Constitutional:       General: He is not in acute distress.     Appearance: He is well-developed. He is not ill-appearing or toxic-appearing.   HENT:      Head: Normocephalic and atraumatic.      Mouth/Throat:      Mouth: Mucous membranes are moist. No oral lesions.      Tongue: No lesions.      Pharynx: No pharyngeal swelling, oropharyngeal exudate, posterior oropharyngeal erythema or uvula swelling.   Eyes:      General: No scleral icterus.     Conjunctiva/sclera: Conjunctivae normal.      Pupils: Pupils are equal, round, and reactive to light.   Neck:      Vascular: No carotid bruit.   Cardiovascular:      Rate and Rhythm: Normal rate and regular rhythm.      Pulses:           Carotid pulses are 2+ on the right side and 2+ on the left side.       Radial pulses are 2+ on the right side and 2+ on the left side.      Heart sounds: Normal heart sounds.   Pulmonary:      Effort: Pulmonary effort is normal. No respiratory distress.      Breath sounds: Normal breath sounds. No wheezing, rhonchi or rales.   Musculoskeletal:      Cervical back: Normal range of motion and neck supple. No muscular tenderness.      Left foot: Normal range of motion. Deformity (minimal bunion deformity first MTP) present. No tenderness, bony tenderness or crepitus.   Lymphadenopathy:      Cervical: No cervical adenopathy.   Neurological:      Mental Status: He is alert and oriented to person, place, and time.      Cranial Nerves: No cranial nerve deficit.      Gait: Gait normal.   Psychiatric:         Attention and Perception: " Attention normal.         Mood and Affect: Mood and affect normal.         Behavior: Behavior normal.         Thought Content: Thought content normal.         Assessment/ Plan  Diagnoses and all orders for this visit:    Primary hypertension    MARGOT on auto CPAP    Podagra    IFG (impaired fasting glucose)    Bunion of great toe of left foot    Fatty liver        Return in about 6 months (around 1/28/2026) for Annual physical.      Discussion:  Omar Ha is a 54 y.o. male RTC in f/u:  1.  HTN, dx'd after persistent blood pressure elevation in office at urology in past -now off lisinopril 30 mg daily after lower blood pressure numbers with intentional weight loss in 2025.  BMP OK.  2. MARGOT  - new dx in 2022. Increase pressures in 8/2024. Compliant on CPAP nightly s/p sleep med evaluation 6/2025.  Advised to C/W CPAP, though low threshold to recheck pressures if patient achieves additional weight loss.   3. B knee pain -QOL issue over a year, triggered by pickleball.  S/p orthopedic evaluation Dr. Farah 11/2024 with Dx chondromalacia and quadriceps tendinitis, no OA on exam.  Overall improved SX with time and modified diet (cessation of added sugars and reduce carb intake).    4. Fatty liver, US shown/Hx ALT elevation/ IFG  -AST and ALT returned to normal with resolved FBS and noted weight loss with TLC diet modifications.  Congratulated patient on efforts.  C/W weight control and TLC diet.  5.  Presumptive Dx gout -left first MTP, last submitted for 2025 patient declined allopurinol initiation in favor of dietary modifications.  No recurrence.  Trend    RTC 6 months CPE, F labs prior

## (undated) DEVICE — GLV SURG SENSICARE PI MIC PF SZ7.5 LF STRL

## (undated) DEVICE — TIDISHIELD UROLOGY DRAIN BAGS FROSTY VINYL STERILE FITS SIEMENS UROSKOP ACCESS 20 PER CASE: Brand: TIDISHIELD

## (undated) DEVICE — URETERAL DILATATION SYSTEM

## (undated) DEVICE — CATH URETRL PA CONE TP 8F

## (undated) DEVICE — PK URETSCP 40

## (undated) DEVICE — PRT BIOP SEALS

## (undated) DEVICE — FIBR LASR FLEXIVAPULSE365 HOLMIUM FLT/TP 1P/U